# Patient Record
Sex: FEMALE | Race: WHITE | NOT HISPANIC OR LATINO | Employment: OTHER | URBAN - METROPOLITAN AREA
[De-identification: names, ages, dates, MRNs, and addresses within clinical notes are randomized per-mention and may not be internally consistent; named-entity substitution may affect disease eponyms.]

---

## 2024-04-18 PROBLEM — M19.90 OSTEOARTHRITIS: Status: ACTIVE | Noted: 2024-04-18

## 2024-04-18 PROBLEM — M19.90 DEGENERATIVE JOINT DISEASE: Status: ACTIVE | Noted: 2024-04-18

## 2024-04-18 PROBLEM — M85.80 OSTEOPENIA: Status: ACTIVE | Noted: 2024-04-18

## 2024-04-18 PROBLEM — F10.10 ALCOHOL ABUSE: Status: ACTIVE | Noted: 2024-04-18

## 2024-04-18 PROBLEM — F09 MILD COGNITIVE DISORDER: Status: ACTIVE | Noted: 2024-04-18

## 2024-04-18 PROBLEM — N18.2 STAGE 2 CHRONIC KIDNEY DISEASE: Status: ACTIVE | Noted: 2024-04-18

## 2024-04-18 PROBLEM — R41.3 POOR SHORT-TERM MEMORY: Status: ACTIVE | Noted: 2024-04-18

## 2024-04-18 PROBLEM — E03.8 SUBCLINICAL HYPOTHYROIDISM: Status: ACTIVE | Noted: 2024-04-18

## 2024-04-18 PROBLEM — E78.5 HYPERLIPIDEMIA: Status: ACTIVE | Noted: 2024-04-18

## 2024-04-18 PROBLEM — E78.00 PURE HYPERCHOLESTEROLEMIA: Status: ACTIVE | Noted: 2024-04-18

## 2024-04-18 PROBLEM — F43.29 ADJUSTMENT DISORDER WITH MIXED EMOTIONAL FEATURES: Status: ACTIVE | Noted: 2024-04-18

## 2024-04-18 PROBLEM — R92.8 ABNORMAL MAMMOGRAM: Status: ACTIVE | Noted: 2024-04-18

## 2024-04-18 PROBLEM — I12.9 MALIGNANT HYPERTENSIVE CHRONIC KIDNEY DISEASE: Status: ACTIVE | Noted: 2024-04-18

## 2024-04-18 PROBLEM — I10 ESSENTIAL HYPERTENSION: Status: ACTIVE | Noted: 2024-04-18

## 2024-04-19 ENCOUNTER — OFFICE VISIT (OUTPATIENT)
Dept: PRIMARY CARE | Facility: CLINIC | Age: 84
End: 2024-04-19
Payer: MEDICARE

## 2024-04-19 VITALS
OXYGEN SATURATION: 99 % | BODY MASS INDEX: 22.71 KG/M2 | SYSTOLIC BLOOD PRESSURE: 120 MMHG | DIASTOLIC BLOOD PRESSURE: 80 MMHG | HEIGHT: 64 IN | TEMPERATURE: 97 F | WEIGHT: 133 LBS | HEART RATE: 50 BPM

## 2024-04-19 DIAGNOSIS — I10 ESSENTIAL HYPERTENSION: Primary | ICD-10-CM

## 2024-04-19 PROCEDURE — 99213 OFFICE O/P EST LOW 20 MIN: CPT | Performed by: LICENSED PRACTICAL NURSE

## 2024-04-19 PROCEDURE — 1036F TOBACCO NON-USER: CPT | Performed by: LICENSED PRACTICAL NURSE

## 2024-04-19 PROCEDURE — 1159F MED LIST DOCD IN RCRD: CPT | Performed by: LICENSED PRACTICAL NURSE

## 2024-04-19 PROCEDURE — 3079F DIAST BP 80-89 MM HG: CPT | Performed by: LICENSED PRACTICAL NURSE

## 2024-04-19 PROCEDURE — 3074F SYST BP LT 130 MM HG: CPT | Performed by: LICENSED PRACTICAL NURSE

## 2024-04-19 PROCEDURE — 1160F RVW MEDS BY RX/DR IN RCRD: CPT | Performed by: LICENSED PRACTICAL NURSE

## 2024-04-19 PROCEDURE — 1126F AMNT PAIN NOTED NONE PRSNT: CPT | Performed by: LICENSED PRACTICAL NURSE

## 2024-04-19 RX ORDER — PRAVASTATIN SODIUM 40 MG/1
TABLET ORAL
COMMUNITY
End: 2024-04-19 | Stop reason: ALTCHOICE

## 2024-04-19 RX ORDER — ASCORBIC ACID 1000 MG
TABLET ORAL EVERY 24 HOURS
COMMUNITY
End: 2024-04-19 | Stop reason: WASHOUT

## 2024-04-19 RX ORDER — LISINOPRIL 20 MG/1
20 TABLET ORAL EVERY 24 HOURS
Qty: 30 TABLET | Refills: 11 | Status: SHIPPED | OUTPATIENT
Start: 2024-04-19

## 2024-04-19 RX ORDER — LISINOPRIL 20 MG/1
TABLET ORAL EVERY 24 HOURS
COMMUNITY
End: 2024-04-19 | Stop reason: SDUPTHER

## 2024-04-19 ASSESSMENT — PAIN SCALES - GENERAL: PAINLEVEL: 0-NO PAIN

## 2024-04-19 ASSESSMENT — PATIENT HEALTH QUESTIONNAIRE - PHQ9
2. FEELING DOWN, DEPRESSED OR HOPELESS: NOT AT ALL
SUM OF ALL RESPONSES TO PHQ9 QUESTIONS 1 AND 2: 0
1. LITTLE INTEREST OR PLEASURE IN DOING THINGS: NOT AT ALL

## 2024-04-19 NOTE — PROGRESS NOTES
[facility]: [facility]  PROGRESS NOTE      Keri Perez is a 83 y.o. female that is presenting today for Med Refill ([facility] from [facility]/).      Subjective   Pt presents to the office today to establish a new provider. She was seen by JAIME Hernandez NP who is no longer with the practice. She is requesting a refill of her medications. She denies HA, blurred vision, or dizziness. She reports having increased forgetfulness and concern regarding driving to new locations    Med Refill      Review of Systems   All other systems reviewed and are negative.     Objective   Vitals:    04/19/24 1310   BP: 120/80   Pulse: 50   Temp: 36.1 °C (97 °F)   SpO2: 99%      Body mass index is 22.83 kg/m².  Physical Exam  Cardiovascular:      Rate and Rhythm: Normal rate and regular rhythm.   Pulmonary:      Effort: Pulmonary effort is normal. No respiratory distress.      Breath sounds: Normal breath sounds. No stridor. No wheezing, rhonchi or rales.   Chest:      Chest wall: No tenderness.   Neurological:      Mental Status: She is confused.      Comments: Noted forgetfulness during conversation. Needed to repeat lisinopril purpose a few times.        Diagnostic Results   Lab Results   Component Value Date    GLUCOSE 98 05/23/2022    CALCIUM 9.8 05/23/2022     05/23/2022    K 4.1 05/23/2022    CO2 27 05/23/2022     05/23/2022    BUN 14 05/23/2022    CREATININE 0.9 05/23/2022     Lab Results   Component Value Date    ALT 21 05/23/2022    AST 25 05/23/2022    ALKPHOS 74 05/23/2022    BILITOT 1.0 05/23/2022     Lab Results   Component Value Date    WBC 5.7 05/23/2022    HGB 13.6 05/23/2022    HCT 40.7 05/23/2022    MCV 95.1 05/23/2022     05/23/2022     Lab Results   Component Value Date    CHOL 228 (H) 05/23/2022    CHOL 214 (H) 05/21/2021    CHOL 226 (H) 12/11/2020     Lab Results   Component Value Date    HDL 81 05/23/2022    HDL 74 05/21/2021    HDL 99 12/11/2020     Lab Results   Component  "Value Date    LDLCALC 126 2022    LDLCALC 118 2021    LDLCALC 109 2020     Lab Results   Component Value Date    TRIG 103 2022    TRIG 108 2021    TRIG 89 2020     No components found for: \"CHOLHDL\"  No results found for: \"HGBA1C\"  Other labs not included in the list above were reviewed either before or during this encounter.    History    Past Medical History:   Diagnosis Date    Dementia (Multi)      History reviewed. No pertinent surgical history.  No family history on file.  Social History     Socioeconomic History    Marital status:      Spouse name: Not on file    Number of children: Not on file    Years of education: Not on file    Highest education level: Not on file   Occupational History    Not on file   Tobacco Use    Smoking status: Former     Current packs/day: 0.00     Types: Cigarettes     Quit date:      Years since quittin.3     Passive exposure: Past    Smokeless tobacco: Former   Vaping Use    Vaping status: Never Used   Substance and Sexual Activity    Alcohol use: Yes     Alcohol/week: 3.0 standard drinks of alcohol     Types: 3 Glasses of wine per week    Drug use: Never    Sexual activity: Not on file   Other Topics Concern    Not on file   Social History Narrative    Not on file     Social Determinants of Health     Financial Resource Strain: Not on file   Food Insecurity: Not on file   Transportation Needs: Not on file   Physical Activity: Not on file   Stress: Not on file   Social Connections: Not on file   Intimate Partner Violence: Not on file   Housing Stability: Not on file     No Known Allergies  Current Outpatient Medications on File Prior to Visit   Medication Sig Dispense Refill    [DISCONTINUED] lisinopril 20 mg tablet Take by mouth once every 24 hours.      [DISCONTINUED] mv-min-folic-calcium carb-K1 (Women's 50 Plus Multivitamin) 400 mcg-500 mg calcium-20 mcg tablet once every 24 hours.      [DISCONTINUED] pravastatin " (Pravachol) 40 mg tablet Take by mouth.       No current facility-administered medications on file prior to visit.     Immunization History   Administered Date(s) Administered    Moderna SARS-CoV-2 Vaccination 01/27/2021, 03/03/2021, 11/11/2021    Pneumococcal conjugate vaccine, 13-valent (PREVNAR 13) 11/04/2014    Pneumococcal polysaccharide vaccine, 23-valent, age 2 years and older (PNEUMOVAX 23) 09/07/2010    Td vaccine, age 7 years and older (TDVAX) 01/02/2006     Patient's medical history was reviewed and updated either before or during this encounter.       Assessment/Plan   Problem List Items Addressed This Visit       Essential hypertension - Primary    Relevant Medications    lisinopril 20 mg tablet    Other Relevant Orders    Referral to Geriatrics - Senior Assessment   I will reorder her lisinopril. I will also refer Ms. Bradford for a Senior Assessment related to her increased forgetfulness and dementia as she report worsening dementia.     I will have her back in the office for her annual exam within 1 month.     Henri Hunter, APRN-CNP

## 2024-05-09 ENCOUNTER — OFFICE VISIT (OUTPATIENT)
Dept: BEHAVIORAL HEALTH | Facility: HOSPITAL | Age: 84
End: 2024-05-09
Payer: MEDICARE

## 2024-05-09 ENCOUNTER — OFFICE VISIT (OUTPATIENT)
Dept: GERIATRIC MEDICINE | Facility: HOSPITAL | Age: 84
End: 2024-05-09
Payer: MEDICARE

## 2024-05-09 ENCOUNTER — LAB (OUTPATIENT)
Dept: LAB | Facility: LAB | Age: 84
End: 2024-05-09
Payer: MEDICARE

## 2024-05-09 DIAGNOSIS — M85.89 OSTEOPENIA OF MULTIPLE SITES: ICD-10-CM

## 2024-05-09 DIAGNOSIS — I10 ESSENTIAL HYPERTENSION: ICD-10-CM

## 2024-05-09 DIAGNOSIS — R41.3 MEMORY LOSS: Primary | ICD-10-CM

## 2024-05-09 DIAGNOSIS — R41.3 MEMORY LOSS: ICD-10-CM

## 2024-05-09 LAB
ALBUMIN SERPL BCP-MCNC: 4.7 G/DL (ref 3.4–5)
ALP SERPL-CCNC: 69 U/L (ref 33–136)
ALT SERPL W P-5'-P-CCNC: 24 U/L (ref 7–45)
ANION GAP SERPL CALC-SCNC: 16 MMOL/L (ref 10–20)
AST SERPL W P-5'-P-CCNC: 29 U/L (ref 9–39)
BILIRUB SERPL-MCNC: 1.4 MG/DL (ref 0–1.2)
BUN SERPL-MCNC: 15 MG/DL (ref 6–23)
CALCIUM SERPL-MCNC: 9.4 MG/DL (ref 8.6–10.3)
CHLORIDE SERPL-SCNC: 106 MMOL/L (ref 98–107)
CO2 SERPL-SCNC: 23 MMOL/L (ref 21–32)
CREAT SERPL-MCNC: 0.85 MG/DL (ref 0.5–1.05)
EGFRCR SERPLBLD CKD-EPI 2021: 68 ML/MIN/1.73M*2
ERYTHROCYTE [DISTWIDTH] IN BLOOD BY AUTOMATED COUNT: 13.6 % (ref 11.5–14.5)
GLUCOSE SERPL-MCNC: 87 MG/DL (ref 74–99)
HCT VFR BLD AUTO: 41 % (ref 36–46)
HGB BLD-MCNC: 13.7 G/DL (ref 12–16)
MCH RBC QN AUTO: 32.2 PG (ref 26–34)
MCHC RBC AUTO-ENTMCNC: 33.4 G/DL (ref 32–36)
MCV RBC AUTO: 97 FL (ref 80–100)
NRBC BLD-RTO: 0 /100 WBCS (ref 0–0)
PLATELET # BLD AUTO: 221 X10*3/UL (ref 150–450)
POTASSIUM SERPL-SCNC: 3.9 MMOL/L (ref 3.5–5.3)
PROT SERPL-MCNC: 7.2 G/DL (ref 6.4–8.2)
RBC # BLD AUTO: 4.25 X10*6/UL (ref 4–5.2)
SODIUM SERPL-SCNC: 141 MMOL/L (ref 136–145)
TSH SERPL-ACNC: 3.31 MIU/L (ref 0.44–3.98)
WBC # BLD AUTO: 4.5 X10*3/UL (ref 4.4–11.3)

## 2024-05-09 PROCEDURE — 82607 VITAMIN B-12: CPT

## 2024-05-09 PROCEDURE — 1160F RVW MEDS BY RX/DR IN RCRD: CPT | Performed by: NURSE PRACTITIONER

## 2024-05-09 PROCEDURE — 36415 COLL VENOUS BLD VENIPUNCTURE: CPT

## 2024-05-09 PROCEDURE — 83090 ASSAY OF HOMOCYSTEINE: CPT

## 2024-05-09 PROCEDURE — 99483 ASSMT & CARE PLN PT COG IMP: CPT | Performed by: NURSE PRACTITIONER

## 2024-05-09 PROCEDURE — 80053 COMPREHEN METABOLIC PANEL: CPT

## 2024-05-09 PROCEDURE — 1159F MED LIST DOCD IN RCRD: CPT | Performed by: NURSE PRACTITIONER

## 2024-05-09 PROCEDURE — 82306 VITAMIN D 25 HYDROXY: CPT

## 2024-05-09 PROCEDURE — 85027 COMPLETE CBC AUTOMATED: CPT

## 2024-05-09 PROCEDURE — 84443 ASSAY THYROID STIM HORMONE: CPT

## 2024-05-09 PROCEDURE — 82746 ASSAY OF FOLIC ACID SERUM: CPT

## 2024-05-09 NOTE — PROGRESS NOTES
*Chief Complaint*  Senior assessment    *History of Present Illness*  Pt is an 82 y/o female w/ PMH as below who presents today for senior assessment evaluation    She presents with concern of memory loss. She mentioned their her memory loss is worsening. She is avoiding new things and have her usual routine. She have 2 children who lives in [place] and [place]. Lived in the same house for 53 years with  (passed ~10 years ago due to natural age related causes) who build the house(industrial //build toys, houses). Memory problem started about one year ago.  Her memory is not concerning when she engaged in conversation with others. She never had an event when she left things on (including stove or other home equipments)    Last MRI in  showed   1. No acute intracranial abnormality.  2. Chronic changes of bilateral cerebral microvascular white matter ischemia  and aging.    PCP: Edyta Hernandez (retired) --> recent new established care with Henri Hunter at  [facility] in 2024  Referred by: PCP   Accompanied by: Alone     PMH: HTN, colon polyp, breast lesion, memory loss  PSH: N/A  FH: N/A  SocHx: lives alone at her home (house), denies smoking, drinks 1-2 glass wine occasionally, and no drug use   Diet: well balanced diet   Allergies: NKA  Meds: None (stopped lisinopril in the last 2 months by PCP)    VS - BP: 166/81  P:   69 SpO2: 97%    Current residence: single family home     Marital Status:    - Name of spouse Aakash Perez    - Name(s) of offspring daughter Vira and son Winston    - Name(s) of pets none (used to have dogs and fish)    Educational level: College    Education: [employer], Major in Zimbabwean / English     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 Meet with friends for breakfast (teachers) once a month /  breakfast places.     Social engagement: Frequent<Occasional    Social activities: spending time with family    Social support: yes, family, friends, neighbors, and significant other (dementia dx)     Name of caregiver: Support-> daughter     - Relationship: daughter Vira    - Occupation: Works for [employer] ([employer])   - Telephone: [phone]    - Stress level unknown (looking for nursing home or have a roommate)      Finance: sufficient    Driving:  yes , does not go to new places     Personal safety concerns:  no     Home safety concerns: no / no recent falls     Functional status:   - Appetite: WNL   - Swallow: WNL,   - Elimination - Bowel: Continent,   - Elimination - Bladder: Continent   - Speech:  WNL   - Vision: WNL   - Hearing: WNL   - Understanding: WNL   - Upper body strength: WNL   - Lower body strength: WNL   - Balance: WNL   - Falls: None    Activity: Independent   - Ambulation inside / Independent   - Ambulation outside /Independent   - Aid use None   - Exercise/ walking daily and taking care of her yard    ADL: Independent   - Feeding Independent   - Bathing Independent   - Dressing Independent   - Toileting Independent    IADL: Independent   - Cooking Independent   - Cleaning Independent   - Shopping  Independent    - Medication Independent   - Driving Independent   - Banking / daughter     *Review of Systems*  All other systems reviewed are negative except as noted in the HPI    *Physical Exam*  Gen: (+) NAD, (+) well-appearing  HEENT: (+) normocephalic  Neck: (+) supple  Lungs: (+) CTAB, (-) wheezes, (-) rales, (-) rhonchi  Heart: (+) RRR, (+) S1 S2, (-) murmurs  Pulses: (+) palpable  Abd: (+) soft, (+) NT, (+) ND, (+) BS+  Ext: (-) edema, (-) deformity  MSK: (-) joint swelling  Skin: (+) warm, (+) dry, (-) rash  Neuro: (+) follows commands, (-) tremor, (+) alert    *Results*  MoCA:  24/30  PHQ9:  1/27  GAD7:  1/21    *Assessment / Plan*  Senior Assessment/Recommendations     Medical  Consult   - Test results - as above   - Cognition and Memory - as above   - Additional testing pending / recommended      - home evaluation     - driving evaluation    #Mild Cognitive Impairment   * Diet     - The risk of developing MCI is lower in individuals who consume a Mediterranean diet      - Dietary supplementation with DHA, melatonin and tryptophan may improve cognitive function      - Eating 2 or more servings of mushrooms a week may decrease risk of memory loss    * Exercise      - Physical activity and exercise are beneficial for brain health.       - According to one study, aerobic exercise was associated with a slight improvement in cognition    * Activities     - Mentally challenging activities like crossword puzzles and brain teasers may be helpful     - Playing games, reading magazines, being engaged in crafts, computer use and social activity all reduce the risk of memory loss      - [facility] centers or adult day programs may be a good resource for social engagement   * Medications      - Donepezil has been found to delay the progression of memory loss in patients with MCI and depression      - Centrally acting angiotensin-converting enzyme inhibitors (CACE-Is) may reduce the rate of cognitive decline in patients with dementia, regardless of blood pressure levels at the time of their hypertension diagnosis. The rate of cognitive change was slowed in the first 6 months after dementia patients started taking these medications.     #Hypertension  - your blood pressure is poorly controlled today  *Recommendations  - according to JNC8 guideline recommendations for lifestyle changes    - no smoking    - DASH diet or Mediterranean diet    - limit alcohol consumption    - reduce sodium intake to less than 2400mg per day    - gradually increase the amount of physical activity and exercise that you get  - continue taking your medications as prescribed    - consider increasing lisinopril to 40mg daily  -  monitor your blood pressure at home twice per day - bring your monitor and/or your records of these readings with you to all appointments     - if your blood pressure readings are consistently greater than 130/80 or less than 100/60 - please call your PCP - you may need a medication adjustment     - if you develop chest pain, shortness of breath, headache, dizziness, vision changes, or weakness or your blood pressure readings are ever greater than 180/110 - please recheck the readings at least twice - and if still elevated please go to the emergency room      #Homocysteinemia   - recommend starting homocysteine formulated B complex

## 2024-05-10 LAB
25(OH)D3 SERPL-MCNC: 71 NG/ML (ref 30–100)
FOLATE SERPL-MCNC: >24 NG/ML
HCYS SERPL-SCNC: 16.51 UMOL/L (ref 5–13.9)
VIT B12 SERPL-MCNC: 475 PG/ML (ref 211–911)

## 2024-05-22 ENCOUNTER — HOSPITAL ENCOUNTER (OUTPATIENT)
Dept: RADIOLOGY | Facility: HOSPITAL | Age: 84
Discharge: HOME | End: 2024-05-22
Payer: MEDICARE

## 2024-05-22 DIAGNOSIS — M85.89 OSTEOPENIA OF MULTIPLE SITES: ICD-10-CM

## 2024-05-22 DIAGNOSIS — R41.3 MEMORY LOSS: ICD-10-CM

## 2024-05-22 DIAGNOSIS — I10 ESSENTIAL HYPERTENSION: ICD-10-CM

## 2024-05-22 PROCEDURE — 70551 MRI BRAIN STEM W/O DYE: CPT | Performed by: RADIOLOGY

## 2024-05-22 PROCEDURE — 70551 MRI BRAIN STEM W/O DYE: CPT

## 2024-05-28 ENCOUNTER — LAB (OUTPATIENT)
Dept: LAB | Facility: LAB | Age: 84
End: 2024-05-28
Payer: MEDICARE

## 2024-05-28 ENCOUNTER — OFFICE VISIT (OUTPATIENT)
Dept: PRIMARY CARE | Facility: CLINIC | Age: 84
End: 2024-05-28
Payer: MEDICARE

## 2024-05-28 VITALS
OXYGEN SATURATION: 97 % | DIASTOLIC BLOOD PRESSURE: 70 MMHG | TEMPERATURE: 97 F | SYSTOLIC BLOOD PRESSURE: 120 MMHG | WEIGHT: 131 LBS | HEART RATE: 83 BPM | BODY MASS INDEX: 22.36 KG/M2 | HEIGHT: 64 IN

## 2024-05-28 DIAGNOSIS — R73.9 ELEVATED BLOOD SUGAR: ICD-10-CM

## 2024-05-28 DIAGNOSIS — Z01.89 ENCOUNTER FOR ROUTINE LABORATORY TESTING: ICD-10-CM

## 2024-05-28 DIAGNOSIS — E55.9 VITAMIN D DEFICIENCY: ICD-10-CM

## 2024-05-28 DIAGNOSIS — R79.9 ABNORMAL FINDING OF BLOOD CHEMISTRY, UNSPECIFIED: ICD-10-CM

## 2024-05-28 DIAGNOSIS — F09 MILD COGNITIVE DISORDER: ICD-10-CM

## 2024-05-28 DIAGNOSIS — M81.6 LOCALIZED OSTEOPOROSIS (LEQUESNE): ICD-10-CM

## 2024-05-28 DIAGNOSIS — R94.6 ABNORMAL RESULTS OF THYROID FUNCTION STUDIES: ICD-10-CM

## 2024-05-28 DIAGNOSIS — I67.9 SMALL VESSEL DISEASE, CEREBROVASCULAR: ICD-10-CM

## 2024-05-28 DIAGNOSIS — Z13.820 ENCOUNTER FOR SCREENING FOR OSTEOPOROSIS: ICD-10-CM

## 2024-05-28 DIAGNOSIS — D22.9 ATYPICAL MOLE: ICD-10-CM

## 2024-05-28 DIAGNOSIS — Z00.00 MEDICARE ANNUAL WELLNESS VISIT, INITIAL: Primary | ICD-10-CM

## 2024-05-28 DIAGNOSIS — M85.80 OSTEOPENIA, UNSPECIFIED LOCATION: ICD-10-CM

## 2024-05-28 DIAGNOSIS — Z13.6 ENCOUNTER FOR SCREENING FOR CARDIOVASCULAR DISORDERS: ICD-10-CM

## 2024-05-28 DIAGNOSIS — R41.3 POOR SHORT-TERM MEMORY: ICD-10-CM

## 2024-05-28 DIAGNOSIS — I10 HYPERTENSION, UNSPECIFIED TYPE: ICD-10-CM

## 2024-05-28 LAB
ALBUMIN SERPL-MCNC: 4.5 G/DL (ref 3.5–5)
ALP BLD-CCNC: 83 U/L (ref 35–125)
ALT SERPL-CCNC: 18 U/L (ref 5–40)
ANION GAP SERPL CALC-SCNC: 10 MMOL/L
AST SERPL-CCNC: 22 U/L (ref 5–40)
BILIRUB DIRECT SERPL-MCNC: <0.2 MG/DL (ref 0–0.2)
BILIRUB SERPL-MCNC: 0.8 MG/DL (ref 0.1–1.2)
BUN SERPL-MCNC: 25 MG/DL (ref 8–25)
CALCIUM SERPL-MCNC: 9.6 MG/DL (ref 8.5–10.4)
CHLORIDE SERPL-SCNC: 103 MMOL/L (ref 97–107)
CO2 SERPL-SCNC: 28 MMOL/L (ref 24–31)
CREAT SERPL-MCNC: 0.9 MG/DL (ref 0.4–1.6)
EGFRCR SERPLBLD CKD-EPI 2021: 64 ML/MIN/1.73M*2
EST. AVERAGE GLUCOSE BLD GHB EST-MCNC: 105 MG/DL
GLUCOSE SERPL-MCNC: 90 MG/DL (ref 65–99)
HBA1C MFR BLD: 5.3 %
POTASSIUM SERPL-SCNC: 4.3 MMOL/L (ref 3.4–5.1)
PROT SERPL-MCNC: 7.2 G/DL (ref 5.9–7.9)
SODIUM SERPL-SCNC: 141 MMOL/L (ref 133–145)

## 2024-05-28 PROCEDURE — 82248 BILIRUBIN DIRECT: CPT

## 2024-05-28 PROCEDURE — 3074F SYST BP LT 130 MM HG: CPT | Performed by: LICENSED PRACTICAL NURSE

## 2024-05-28 PROCEDURE — 1126F AMNT PAIN NOTED NONE PRSNT: CPT | Performed by: LICENSED PRACTICAL NURSE

## 2024-05-28 PROCEDURE — 36415 COLL VENOUS BLD VENIPUNCTURE: CPT

## 2024-05-28 PROCEDURE — 1159F MED LIST DOCD IN RCRD: CPT | Performed by: LICENSED PRACTICAL NURSE

## 2024-05-28 PROCEDURE — 3078F DIAST BP <80 MM HG: CPT | Performed by: LICENSED PRACTICAL NURSE

## 2024-05-28 PROCEDURE — 83036 HEMOGLOBIN GLYCOSYLATED A1C: CPT

## 2024-05-28 PROCEDURE — 1160F RVW MEDS BY RX/DR IN RCRD: CPT | Performed by: LICENSED PRACTICAL NURSE

## 2024-05-28 PROCEDURE — 80053 COMPREHEN METABOLIC PANEL: CPT

## 2024-05-28 PROCEDURE — 80061 LIPID PANEL: CPT

## 2024-05-28 PROCEDURE — 99214 OFFICE O/P EST MOD 30 MIN: CPT | Performed by: LICENSED PRACTICAL NURSE

## 2024-05-28 PROCEDURE — 84443 ASSAY THYROID STIM HORMONE: CPT

## 2024-05-28 RX ORDER — CHOLECALCIFEROL (VITAMIN D3) 25 MCG
1000 TABLET ORAL DAILY
COMMUNITY

## 2024-05-28 ASSESSMENT — PAIN SCALES - GENERAL: PAINLEVEL: 0-NO PAIN

## 2024-05-28 NOTE — PROGRESS NOTES
[facility]: [facility]  PHYSICAL EXAM      Keri Perez is a 83 y.o. female that is presenting today for Annual Exam.    Assessment/Plan   Problem List Items Addressed This Visit       Poor short-term memory    Osteopenia     -vitamin d 3 1000 international units daily   -serum vitamin d  -dexa scan         Mild cognitive disorder     -mri showing chronic small vessel ischemic changes, mild and mild diffuse cerebral volume loss.   -referral to neurology         Hypertension     -lisinopril 20mg PO daily  -bmp         Atypical mole     -referral to dermatology         Relevant Orders    Referral to Dermatology    Elevated blood sugar    Relevant Orders    Hemoglobin A1C (Completed)    Medicare annual wellness visit, initial - Primary    Small vessel disease, cerebrovascular    Relevant Orders    Referral to Neurology     Other Visit Diagnoses       Vitamin D deficiency        Encounter for screening for osteoporosis        Relevant Orders    XR DEXA bone density    Localized osteoporosis ([place])        Relevant Orders    XR DEXA bone density    Abnormal results of thyroid function studies        Relevant Orders    TSH with reflex to Free T4 if abnormal           Subjective   Pt presents to the office today for her annual physical exam. Ms. Perez has a PMH of subclinical hypothyroidism, CKD 2, hypercholesterolemia, poor short term memory, osteopenia, OA, and HTN.  She was seen at our office last on 4/19/24 to establish a new provider and medication refill. At that time Ms. Perez was noted to have increased forgetfulness and stable BP. I refilled her lisinopril and  referred her for a senior assessment where an MRI of the brain was ordered. Her MRI showed probable chronic small vessel ischemic changes, mildly and age related mild diffuse cerebral volume loss. I also recommended that she have her daughter come to her office appointments due to her increased forgetfulness.     Today Ms. Perez  shares that she tends to forget where she is going and not recognize her surroundings at times, but she generally does well driving as long as she stays in areas that she is familiar with. Ms. Perez expresses that she only drives to familiar places. Aside from this Ms Perez denies other concerns. She denies HA, dizziness, blurred vision, SOB, CP, palpitations, ABD pain, changes in her stool, blood in her stool, urinary frequency, blood in her urine, swelling of her legs, increased weakness or new moles.            Review of Systems   All other systems reviewed and are negative.     Objective   Vitals:    05/28/24 1305   BP: 120/70   Pulse: 83   Temp: 36.1 °C (97 °F)   SpO2: 97%     Body mass index is 22.49 kg/m².  Physical Exam  Vitals reviewed.   Constitutional:       General: She is not in acute distress.     Appearance: She is not ill-appearing or toxic-appearing.   Neck:      Vascular: No carotid bruit or JVD.   Cardiovascular:      Rate and Rhythm: Normal rate and regular rhythm.      Heart sounds: Normal heart sounds, S1 normal and S2 normal. No murmur heard.  Pulmonary:      Effort: No respiratory distress.      Breath sounds: No wheezing or rales.   Abdominal:      General: There is no abdominal bruit.      Palpations: Abdomen is soft. There is no mass.      Tenderness: There is no abdominal tenderness. There is no guarding or rebound.      Hernia: No hernia is present.   Musculoskeletal:      Right lower leg: No edema.      Left lower leg: No edema.   Lymphadenopathy:      Cervical:      Right cervical: No superficial, deep or posterior cervical adenopathy.     Left cervical: No superficial, deep or posterior cervical adenopathy.   Skin:            Comments: Abnormal texture and color mole to lower back   Neurological:      Mental Status: She is alert.      Comments: Moderately forgetful with need for repeating of conversation        Diagnostic Results   Lab Results   Component Value Date    GLUCOSE 90  "2024    CALCIUM 9.6 2024     2024    K 4.3 2024    CO2 28 2024     2024    BUN 25 2024    CREATININE 0.90 2024     Lab Results   Component Value Date    ALT 18 2024    AST 22 2024    ALKPHOS 83 2024    BILITOT 0.8 2024     Lab Results   Component Value Date    WBC 4.5 2024    HGB 13.7 2024    HCT 41.0 2024    MCV 97 2024     2024     Lab Results   Component Value Date    CHOL 248 (H) 2024    CHOL 228 (H) 2022    CHOL 214 (H) 2021     Lab Results   Component Value Date    HDL 78.0 2024    HDL 81 2022    HDL 74 2021     Lab Results   Component Value Date    LDLCALC 135 (H) 2024    LDLCALC 126 2022    LDLCALC 118 2021     Lab Results   Component Value Date    TRIG 173 (H) 2024    TRIG 103 2022    TRIG 108 2021     No components found for: \"CHOLHDL\"  Lab Results   Component Value Date    HGBA1C 5.3 2024     Other labs not included in the list above were reviewed either before or during this encounter.    History   Past Medical History:   Diagnosis Date    Dementia (Multi)      History reviewed. No pertinent surgical history.  No family history on file.  Social History     Socioeconomic History    Marital status:      Spouse name: Not on file    Number of children: Not on file    Years of education: Not on file    Highest education level: Not on file   Occupational History    Not on file   Tobacco Use    Smoking status: Former     Current packs/day: 0.00     Types: Cigarettes     Quit date:      Years since quittin.4     Passive exposure: Past    Smokeless tobacco: Former   Vaping Use    Vaping status: Never Used   Substance and Sexual Activity    Alcohol use: Yes     Alcohol/week: 3.0 standard drinks of alcohol     Types: 3 Glasses of wine per week    Drug use: Never    Sexual activity: Not on file   Other " Topics Concern    Not on file   Social History Narrative    Not on file     Social Determinants of Health     Financial Resource Strain: Not on file   Food Insecurity: Not on file   Transportation Needs: Not on file   Physical Activity: Not on file   Stress: Not on file   Social Connections: Not on file   Intimate Partner Violence: Not on file   Housing Stability: Not on file     No Known Allergies  Current Outpatient Medications on File Prior to Visit   Medication Sig Dispense Refill    cholecalciferol (Vitamin D3) 25 MCG (1000 UT) tablet Take 1 tablet (1,000 Units) by mouth once daily.      lisinopril 20 mg tablet Take 1 tablet (20 mg) by mouth once every 24 hours. (Patient not taking: Reported on 5/28/2024) 30 tablet 11    mv-mn/folic ac/calcium/vit K1 (WOMEN'S 50 PLUS MULTIVITAMIN ORAL) Take by mouth once every 24 hours.       No current facility-administered medications on file prior to visit.     Immunization History   Administered Date(s) Administered    Moderna SARS-CoV-2 Vaccination 01/27/2021, 03/03/2021, 11/11/2021    Pneumococcal conjugate vaccine, 13-valent (PREVNAR 13) 11/04/2014    Pneumococcal polysaccharide vaccine, 23-valent, age 2 years and older (PNEUMOVAX 23) 09/07/2010    Td vaccine, age 7 years and older (TDVAX) 01/02/2006     Patient's medical history was reviewed and updated either before or during this encounter.   Ms. Perez is generally healthy. Her VS are stable and she is without concerns.   With her permission I called Ms. Perez's daughter, Vira, to discuss her plan of care.   Regarding her Care Gaps she would like to discuss her vaccination options including RSV, Zoster, Tdap and Covid, with her daughter while she is home with daughter.     I will order her a Dexa scan due to her history of osteoporosis.     Her exam is benign aside from increased forgetfulness and a mole to her lower back. Due to her increased forgetfulness and MRI abnormalities I will refer her to Neurology for  further evaluation. I will also refer her to dermatology for evaluation of the mole.     Finally I will order annual blood work including CBC, CMP, hepatic and lipid panel, TSH, A1C and 1x calcium scoring test. She will follow up in 6 months for a regular check up.     Henri Hunter, JOHN-CNP

## 2024-05-29 DIAGNOSIS — R79.9 ABNORMAL FINDING OF BLOOD CHEMISTRY, UNSPECIFIED: ICD-10-CM

## 2024-05-29 DIAGNOSIS — Z01.89 ENCOUNTER FOR ROUTINE LABORATORY TESTING: Primary | ICD-10-CM

## 2024-05-29 LAB
CHOLEST SERPL-MCNC: 248 MG/DL (ref 133–200)
CHOLEST/HDLC SERPL: 3.2 {RATIO}
HDLC SERPL-MCNC: 78 MG/DL
LDLC SERPL CALC-MCNC: 135 MG/DL (ref 65–130)
TRIGL SERPL-MCNC: 173 MG/DL (ref 40–150)

## 2024-05-29 NOTE — RESULT ENCOUNTER NOTE
Please inform Ms. Perez her blood work looks good. The only thing concerning is her lipid levels ar starting to rise slightly. She should focus on eating non-saturated fats, or healthy oils and fresh fruits/veggies. She should also participate in healthy low intensity exercise regimen to remain in optimal health.

## 2024-05-30 PROBLEM — Z01.89 ENCOUNTER FOR ROUTINE LABORATORY TESTING: Status: ACTIVE | Noted: 2024-05-30

## 2024-05-30 PROBLEM — R68.89 FORGETFULNESS: Status: ACTIVE | Noted: 2024-05-30

## 2024-05-30 PROBLEM — R73.9 ELEVATED BLOOD SUGAR: Status: ACTIVE | Noted: 2024-05-30

## 2024-05-30 PROBLEM — D22.9 ATYPICAL MOLE: Status: ACTIVE | Noted: 2024-05-30

## 2024-05-30 PROBLEM — Z00.00 MEDICARE ANNUAL WELLNESS VISIT, INITIAL: Status: ACTIVE | Noted: 2024-05-30

## 2024-05-30 PROBLEM — I67.9 SMALL VESSEL DISEASE, CEREBROVASCULAR: Status: ACTIVE | Noted: 2024-05-30

## 2024-05-30 PROBLEM — L72.9 CYST OF SKIN: Status: ACTIVE | Noted: 2024-05-30

## 2024-05-30 PROBLEM — Z13.6 ENCOUNTER FOR SCREENING FOR CARDIOVASCULAR DISORDERS: Status: ACTIVE | Noted: 2024-05-30

## 2024-05-30 LAB — TSH SERPL DL<=0.05 MIU/L-ACNC: 2.58 MIU/L (ref 0.27–4.2)

## 2024-05-30 NOTE — ASSESSMENT & PLAN NOTE
-mri showing chronic small vessel ischemic changes, mild and mild diffuse cerebral volume loss.   -referral to neurology

## 2024-06-04 ENCOUNTER — TELEPHONE (OUTPATIENT)
Dept: PRIMARY CARE | Facility: CLINIC | Age: 84
End: 2024-06-04

## 2024-06-06 ENCOUNTER — OFFICE VISIT (OUTPATIENT)
Dept: GERIATRIC MEDICINE | Facility: HOSPITAL | Age: 84
End: 2024-06-06
Payer: MEDICARE

## 2024-06-06 ENCOUNTER — APPOINTMENT (OUTPATIENT)
Dept: GERIATRIC MEDICINE | Facility: HOSPITAL | Age: 84
End: 2024-06-06
Payer: MEDICARE

## 2024-06-06 ENCOUNTER — DOCUMENTATION (OUTPATIENT)
Dept: GERIATRIC MEDICINE | Facility: HOSPITAL | Age: 84
End: 2024-06-06
Payer: MEDICARE

## 2024-06-06 ENCOUNTER — APPOINTMENT (OUTPATIENT)
Dept: BEHAVIORAL HEALTH | Facility: HOSPITAL | Age: 84
End: 2024-06-06
Payer: MEDICARE

## 2024-06-06 ENCOUNTER — DOCUMENTATION (OUTPATIENT)
Dept: GERIATRIC MEDICINE | Facility: HOSPITAL | Age: 84
End: 2024-06-06

## 2024-06-06 DIAGNOSIS — R41.3 MEMORY LOSS: Primary | ICD-10-CM

## 2024-06-06 DIAGNOSIS — M85.89 OSTEOPENIA OF MULTIPLE SITES: ICD-10-CM

## 2024-06-06 DIAGNOSIS — I10 ESSENTIAL HYPERTENSION: ICD-10-CM

## 2024-06-06 PROCEDURE — 99366 TEAM CONF W/PAT BY HC PROF: CPT | Performed by: NURSE PRACTITIONER

## 2024-06-06 PROCEDURE — 99497 ADVNCD CARE PLAN 30 MIN: CPT | Performed by: NURSE PRACTITIONER

## 2024-06-06 NOTE — PROGRESS NOTES
Impressions:  Mild Cognitive Impairment    Consider Donepezil which has been found to delay the progression of memory loss in patients with mild cognitive impairments  Recommend driving evaluation due to memory changes  Reviewed the 5/22/24  MRI of the Brain and Blood Work  Recommend repeat MOCA in 6 months    MOCA- [place] Cognitive Assessment  24 our 30 Mild cognitive Impairments       Diet  The risk of developing MCI is lower in individuals who consume a Mediterranean diet   Dietary supplementation with DHA, melatonin and tryptophan may improve cognitive  function   Eating 2 or more servings of mushrooms a week may decrease risk of memory loss        Exercise  Physical activity and exercise are beneficial for brain health.   According to one study, aerobic exercise was associated with a slight improvement in cognition       R. Ana (cont.)     Activities  1. Mentally challenging activities like crossword puzzles and brain teasers may be    helpful  2. Playing games, reading magazines, being engaged in crafts, computer use and social activity all reduce the risk of memory loss      - 3.  Senior centers or adult day programs may be a good resource for social engagement     Medications  1. Donepezil has been found to delay the progression of memory loss in patients with MCI and       #Hypertension  - Your blood pressure is poorly controlled today      1.  According to JNC8 guideline recommendations for lifestyle changes       no smoking       DASH diet or Mediterranean diet       limit alcohol consumption       reduce sodium intake to less than 2400mg per day      gradually increase the amount of physical activity and exercise that you get  Continue taking your medications as prescribed  Consider increasing lisinopril to 40mg daily    5.   Monitor your blood pressure at home twice per day - bring your monitor and/or                your       records        of these readings with you to all appointments     6.    Your blood pressure readings are consistently greater than 130/80 or less than            100/60 -    please call your PCP - you may need a medication adjustment  if you develop chest pain, shortness of breath, headache, dizziness, vision changes, or weakness or your blood pressure readings are ever greater than 180/110 - please recheck the readings at least twice - and if still elevated please go to the emergency room      #Homocysteinemia   - Recommend starting homocysteine formulated B complex- you can get this at your local drug store or order on line.      Occupational Therapy Recommendations: Home Evaluation  Declined        consult  MAYELA TrejoEd., LSW    84 year old  ( 50 years  3-4 years) she has a son Winston that lives in [place] and a daughter Vira that lives in [place] she has 5 granddaughter. 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 Patient is very active, walks 1 + hours daily, mows her own lawn.  Patient is independent with ADL's and IADL's. Patient drinks 3 glasses of wine per week she is a former smoker quit in 1974.  Patient had a male  who was a classmate he had to be admitted to an assisted living facility due to advanced dementia. Patient is concern regarding STM loss and confusion.    Driving Evaluation -   [facility] at the    #[phone]  [facility] [phone] option 2  [facility] - [phone]    Recommend Medical Alert Device to be worn at all times for safety.    Mild Cognitive Impairments  May improve with individuals who consume a Mediterranean diet   Physical activity and exercise are beneficial for brain health  Mentally challenging activities like crossword puzzles and brain teasers may be helpful  Social engagement helps  improve mood      5/6/24   Family Meeting    In attendance: Patient, Dario Maravilla, ZAHRA and YOUSUF Yanez M.Ed., LSW  To Review and discuss the Overall Findings and Recommendations  Recommended return in 6 months to repeat MOCA  To fax the Senior Assessment Report and Recommendations to PCP patient to follow Up with PCP ongoing    To Review Advanced Directives including POA for Health Care, Finances, Living Will     Rachel Yanez M.Ed., LSW  Coordinator, [facility]-Senior Assessment Program  [facility]  [address]  [place], [place]    [id number] [phone] fax  [email or web address]

## 2024-06-12 NOTE — PROGRESS NOTES
Mild Cognitive Impairment    Consider Donepezil which has been found to delay the progression of memory loss in patients with mild cognitive impairments  Recommend driving evaluation due to memory changes  PHQ-9 and KENYA- Denied both depression and anxiety  Reviewed the 5/22/24  MRI of the Brain and Blood Work  Recommend repeat MOCA in 6 months    MOCA- [place] Cognitive Assessment  24 our 30 Mild cognitive Impairments      Diet  The risk of developing MCI is lower in individuals who consume a Mediterranean diet   Dietary supplementation with DHA, melatonin and tryptophan may improve cognitive  function   Eating 2 or more servings of mushrooms a week may decrease risk of memory loss       Exercise  Physical activity and exercise are beneficial for brain health.   According to one study, aerobic exercise was associated with a slight improvement in cognition                     R. Ana (cont.)     Activities  1. Mentally challenging activities like crossword puzzles and brain teasers may be       helpful  2. Playing games, reading magazines, being engaged in crafts, computer use and social activity all reduce the risk of memory loss      - 3.  Senior centers or adult day programs may be a good resource for social engagement     Medications  Donepezil has been found to delay the progression of memory loss in patients with MCI and       Hypertension  - Your blood pressure is poorly controlled today    According to JNC8 guideline recommendations for lifestyle changes  Recommend  no smoking  DASH diet or Mediterranean diet  limit alcohol consumption  reduce sodium intake to less than 2400mg per day  gradually increase the amount of physical activity and exercise that you get  Continue taking your medications as prescribed  Consider increasing lisinopril to 40mg daily  Monitor your blood pressure at home twice per day - bring your monitor and/or                            your  records of these readings with you to all  appointments  Your blood pressure readings are consistently greater than 130/80 or less than                  100/60 -    please call your PCP - you may need a medication adjustment  if you develop chest pain, shortness of breath, headache, dizziness, vision changes, or weakness or your blood pressure readings are ever greater than 180/110 - please recheck the readings at least twice - and if still elevated please go to the emergency room      Homocysteinemia   - Recommend starting homocysteine formulated B complex- you can get this at your local drug store or order on line.      Occupational Therapy Recommendations: Home Evaluation  Declined                BERNICE Perez (cont.)      consult  MAYELA TrejoEd., LSW  84 year old  ( 50 years  3-4 years) she has a son Winston that lives in [place] and a daughter Vira that lives in [place] she has 5 granddaughter. 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 Patient is very active, walks 1 + hours daily, mows her own lawn.  Patient is independent with ADL's and IADL's. Patient drinks 3 glasses of wine per week she is a former smoker quit in 1974.  Patient had a male  who was a classmate he had to be admitted to an assisted living facility due to advanced dementia. Patient is concern regarding STM loss and confusion.    Driving Evaluation -   [facility] at the    #[phone]  [facility] [phone] option 2  [facility] - [phone]    Recommend Medical Alert Device to be worn at all times for safety.    Mild Cognitive Impairments  May improve with individuals who consume a Mediterranean diet   Physical activity and exercise are beneficial for brain health  Mentally challenging activities like crossword puzzles and brain  teasers may be helpful  Social engagement helps improve mood      5/6/24   Family Meeting  In attendance: Patient, Dario Maravilla, ZAHRA and YOUSUF Yanez M.Ed., LSW.  Patient did not bring family members to meeting  To Review and discuss the Overall Findings and Recommendations  Recommended return in 6 months to repeat MOCA  Concerns noted regarding forgetting where she had parked and what kind of car she drives.  Contacted Patients daughter Vira discussed concerns regarding memory and anxiety.    To fax the Senior Assessment Report and Recommendations to PCP patient to follow Up with PCP ongoing  Recommend reviewing Advanced Directives including POA for Health Care, Finances, Living Will

## 2024-06-13 NOTE — PROGRESS NOTES
[facility] -Discharge Summary     Patients Name: Keri Perez       ALEJANDRAB: [birth year]  Date of Assessment:   5/9/24       MRN-# [id number]  Primary Care Physician: Roger River      Referred by: PCP  Reason for Assessment: PCP    Consults  Dario Maravilla CNP, Residents,     Impressions:  Mild Cognitive Impairment    Consider Donepezil which has been found to delay the progression of memory loss in patients with mild cognitive impairments  Recommend driving evaluation due to memory changes  Reviewed the 5/22/24  MRI of the Brain and Blood Work  Recommend repeat MOCA in 6 months    MOCA- [place] Cognitive Assessment  24 our 30 Mild cognitive Impairments       Diet  The risk of developing MCI is lower in individuals who consume a Mediterranean diet   Dietary supplementation with DHA, melatonin and tryptophan may improve cognitive  function   Eating 2 or more servings of mushrooms a week may decrease risk of memory loss        Exercise  Physical activity and exercise are beneficial for brain health.   According to one study, aerobic exercise was associated with a slight improvement in cognition                     BERNICE Perez (cont.)     Activities  1. Mentally challenging activities like crossword puzzles and brain teasers may be    helpful  2. Playing games, reading magazines, being engaged in crafts, computer use and social activity all reduce the risk of memory loss      - 3.  Senior centers or adult day programs may be a good resource for social engagement     Medications  1. Donepezil has been found to delay the progression of memory loss in patients with MCI and       #Hypertension  - Your blood pressure is poorly controlled today      1.  According to JNC8 guideline recommendations for lifestyle changes       no smoking       DASH diet or Mediterranean diet       limit alcohol consumption       reduce sodium intake to less than 2400mg per day      gradually  increase the amount of physical activity and exercise that you get  Continue taking your medications as prescribed  Consider increasing lisinopril to 40mg daily    5.   Monitor your blood pressure at home twice per day - bring your monitor and/or                your       records        of these readings with you to all appointments     6.   Your blood pressure readings are consistently greater than 130/80 or less than            100/60 -    please call your PCP - you may need a medication adjustment  if you develop chest pain, shortness of breath, headache, dizziness, vision changes, or weakness or your blood pressure readings are ever greater than 180/110 - please recheck the readings at least twice - and if still elevated please go to the emergency room      #Homocysteinemia   - Recommend starting homocysteine formulated B complex- you can get this at your local drug store or order on line.      Occupational Therapy Recommendations: Home Evaluation  Declined                        consult  Rachel Yanez M.Ed., W    84 year old  ( 50 years  3-4 years) she has a son Winston that lives in [place] and a daughter Vira that lives in [place] she has 5 granddaughter. 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 Patient is very active, walks 1 + hours daily, mows her own lawn.  Patient is independent with ADL's and IADL's. Patient drinks 3 glasses of wine per week she is a former smoker quit in 1974.  Patient had a male  who was a classmate he had to be admitted to an assisted living facility due to advanced dementia. Patient is concern regarding STM loss and confusion.    Driving Evaluation -   [facility] at the    #[phone]  [facility] [phone] option 2  [facility]  [facility] [phone]    Recommend Medical Alert Device to be worn at all times for safety.    Mild Cognitive Impairments  May improve with individuals who consume a Mediterranean diet   Physical activity and exercise are beneficial for brain health  Mentally challenging activities like crossword puzzles and brain teasers may be helpful  Social engagement helps improve mood      5/6/24   Family Meeting    In attendance: Patient, Dario Maravilla, ZAHRA and YOUSUF Yanez M.Ed., LSW  To Review and discuss the Overall Findings and Recommendations  Recommended return in 6 months to repeat MOCA  To fax the Senior Assessment Report and Recommendations to PCP patient to follow Up with PCP ongoing    To Review Advanced Directives including POA for Health Care, Finances, Living Will     Rachel Yanez M.Ed., LSW  Coordinator, [facility]-Senior Assessment Program  [facility]  [address]  [place], [place]    [address] [phone] fax  [email or web address]

## 2024-06-17 ENCOUNTER — APPOINTMENT (OUTPATIENT)
Dept: RADIOLOGY | Facility: HOSPITAL | Age: 84
End: 2024-06-17
Payer: MEDICARE

## 2024-06-19 ENCOUNTER — HOSPITAL ENCOUNTER (OUTPATIENT)
Dept: RADIOLOGY | Facility: HOSPITAL | Age: 84
Discharge: HOME | End: 2024-06-19
Payer: MEDICARE

## 2024-06-19 ENCOUNTER — HOSPITAL ENCOUNTER (OUTPATIENT)
Dept: RADIOLOGY | Facility: HOSPITAL | Age: 84
End: 2024-06-19
Payer: MEDICARE

## 2024-06-19 DIAGNOSIS — M81.6 LOCALIZED OSTEOPOROSIS (LEQUESNE): ICD-10-CM

## 2024-06-19 DIAGNOSIS — Z13.820 ENCOUNTER FOR SCREENING FOR OSTEOPOROSIS: ICD-10-CM

## 2024-06-19 PROCEDURE — 77080 DXA BONE DENSITY AXIAL: CPT | Performed by: STUDENT IN AN ORGANIZED HEALTH CARE EDUCATION/TRAINING PROGRAM

## 2024-06-19 PROCEDURE — 77080 DXA BONE DENSITY AXIAL: CPT

## 2024-07-17 ENCOUNTER — APPOINTMENT (OUTPATIENT)
Dept: PRIMARY CARE | Facility: CLINIC | Age: 84
End: 2024-07-17
Payer: MEDICARE

## 2024-09-18 ENCOUNTER — HOSPITAL ENCOUNTER (OUTPATIENT)
Dept: RADIOLOGY | Facility: HOSPITAL | Age: 84
Discharge: HOME | End: 2024-09-18
Payer: MEDICARE

## 2024-09-18 DIAGNOSIS — Z01.89 ENCOUNTER FOR ROUTINE LABORATORY TESTING: ICD-10-CM

## 2024-09-18 DIAGNOSIS — Z13.6 ENCOUNTER FOR SCREENING FOR CARDIOVASCULAR DISORDERS: ICD-10-CM

## 2024-09-18 PROCEDURE — 75571 CT HRT W/O DYE W/CA TEST: CPT

## 2024-09-27 ENCOUNTER — OFFICE VISIT (OUTPATIENT)
Dept: PRIMARY CARE | Facility: CLINIC | Age: 84
End: 2024-09-27
Payer: MEDICARE

## 2024-09-27 VITALS
HEART RATE: 81 BPM | TEMPERATURE: 97.7 F | HEIGHT: 64 IN | WEIGHT: 131 LBS | BODY MASS INDEX: 22.36 KG/M2 | SYSTOLIC BLOOD PRESSURE: 122 MMHG | OXYGEN SATURATION: 98 % | DIASTOLIC BLOOD PRESSURE: 60 MMHG

## 2024-09-27 DIAGNOSIS — H61.23 EXCESSIVE CERUMEN IN EAR CANAL, BILATERAL: Primary | ICD-10-CM

## 2024-09-27 PROCEDURE — 99212 OFFICE O/P EST SF 10 MIN: CPT | Performed by: LICENSED PRACTICAL NURSE

## 2024-09-27 PROCEDURE — 1126F AMNT PAIN NOTED NONE PRSNT: CPT | Performed by: LICENSED PRACTICAL NURSE

## 2024-09-27 PROCEDURE — 3078F DIAST BP <80 MM HG: CPT | Performed by: LICENSED PRACTICAL NURSE

## 2024-09-27 PROCEDURE — 1159F MED LIST DOCD IN RCRD: CPT | Performed by: LICENSED PRACTICAL NURSE

## 2024-09-27 PROCEDURE — 3074F SYST BP LT 130 MM HG: CPT | Performed by: LICENSED PRACTICAL NURSE

## 2024-09-27 PROCEDURE — 1036F TOBACCO NON-USER: CPT | Performed by: LICENSED PRACTICAL NURSE

## 2024-09-27 ASSESSMENT — PAIN SCALES - GENERAL: PAINLEVEL: 0-NO PAIN

## 2024-09-27 ASSESSMENT — PATIENT HEALTH QUESTIONNAIRE - PHQ9
2. FEELING DOWN, DEPRESSED OR HOPELESS: NOT AT ALL
1. LITTLE INTEREST OR PLEASURE IN DOING THINGS: NOT AT ALL
SUM OF ALL RESPONSES TO PHQ9 QUESTIONS 1 AND 2: 0

## 2024-09-27 NOTE — PROGRESS NOTES
[facility]: [facility]  PROGRESS NOTE      Keri Perez is a 84 y.o. female that is presenting today for Follow-up (Trouble hearing out of left ear ).      Subjective   Pt is a 84yr old female who presents to the office today for left ear hearing loss. Ms. Perez has a PMH of subclinical hypothyroidism, CKD 2, hypercholesterolemia, poor short term memory, osteopenia, OA, and HTN. Pt shares that she first experienced left ear hearing loss 2-3 days ago with out incident. Pt denies pain to her ear.       Review of Systems   HENT:  Positive for hearing loss.       Objective   Vitals:    09/27/24 1312   BP: 122/60   Pulse: 81   Temp: 36.5 °C (97.7 °F)   SpO2: 98%      Body mass index is 22.47 kg/m².  Physical Exam  Vitals reviewed.   Constitutional:       General: She is not in acute distress.     Appearance: Normal appearance. She is not ill-appearing, toxic-appearing or diaphoretic.   HENT:      Left Ear: Hearing normal. There is impacted cerumen.      Ears:      Comments: Right ear nearly fully occluded with cerumen.  Left ear full cerumen occlusion.  No pain upon examination.  Cardiovascular:      Rate and Rhythm: Normal rate.   Pulmonary:      Effort: Pulmonary effort is normal. No respiratory distress.      Breath sounds: No stridor. No wheezing, rhonchi or rales.   Skin:     General: Skin is warm and dry.   Neurological:      Mental Status: She is alert.       Diagnostic Results   Lab Results   Component Value Date    GLUCOSE 90 05/28/2024    CALCIUM 9.6 05/28/2024     05/28/2024    K 4.3 05/28/2024    CO2 28 05/28/2024     05/28/2024    BUN 25 05/28/2024    CREATININE 0.90 05/28/2024     Lab Results   Component Value Date    ALT 18 05/28/2024    AST 22 05/28/2024    ALKPHOS 83 05/28/2024    BILITOT 0.8 05/28/2024     Lab Results   Component Value Date    WBC 4.5 05/09/2024    HGB 13.7 05/09/2024    HCT 41.0 05/09/2024    MCV 97 05/09/2024     05/09/2024     Lab Results  "  Component Value Date    CHOL 248 (H) 2024    CHOL 228 (H) 2022    CHOL 214 (H) 2021     Lab Results   Component Value Date    HDL 78.0 2024    HDL 81 2022    HDL 74 2021     Lab Results   Component Value Date    LDLCALC 135 (H) 2024    LDLCALC 126 2022    LDLCALC 118 2021     Lab Results   Component Value Date    TRIG 173 (H) 2024    TRIG 103 2022    TRIG 108 2021     No components found for: \"CHOLHDL\"  Lab Results   Component Value Date    HGBA1C 5.3 2024     Other labs not included in the list above were reviewed either before or during this encounter.    History    Past Medical History:   Diagnosis Date    Dementia (Multi)      History reviewed. No pertinent surgical history.  No family history on file.  Social History     Socioeconomic History    Marital status:      Spouse name: Not on file    Number of children: Not on file    Years of education: Not on file    Highest education level: Not on file   Occupational History    Not on file   Tobacco Use    Smoking status: Former     Current packs/day: 0.00     Types: Cigarettes     Quit date:      Years since quittin.7     Passive exposure: Past    Smokeless tobacco: Former   Vaping Use    Vaping status: Never Used   Substance and Sexual Activity    Alcohol use: Yes     Alcohol/week: 3.0 standard drinks of alcohol     Types: 3 Glasses of wine per week    Drug use: Never    Sexual activity: Not on file   Other Topics Concern    Not on file   Social History Narrative    Not on file     Social Determinants of Health     Financial Resource Strain: Not on file   Food Insecurity: Not on file   Transportation Needs: Not on file   Physical Activity: Not on file   Stress: Not on file   Social Connections: Not on file   Intimate Partner Violence: Not on file   Housing Stability: Not on file     No Known Allergies  Current Outpatient Medications on File Prior to Visit   Medication " Sig Dispense Refill    cholecalciferol (Vitamin D3) 25 MCG (1000 UT) tablet Take 1 tablet (1,000 Units) by mouth once daily.      lisinopril 20 mg tablet Take 1 tablet (20 mg) by mouth once every 24 hours. 30 tablet 11    mv-mn/folic ac/calcium/vit K1 (WOMEN'S 50 PLUS MULTIVITAMIN ORAL) Take by mouth once every 24 hours.       No current facility-administered medications on file prior to visit.     Immunization History   Administered Date(s) Administered    Moderna SARS-CoV-2 Vaccination 01/27/2021, 03/03/2021, 11/11/2021    Pneumococcal conjugate vaccine, 13-valent (PREVNAR 13) 11/04/2014    Pneumococcal polysaccharide vaccine, 23-valent, age 2 years and older (PNEUMOVAX 23) 09/07/2010    Td vaccine, age 7 years and older (TDVAX) 01/02/2006     Patient's medical history was reviewed and updated either before or during this encounter.       Assessment/Plan   Problem List Items Addressed This Visit    None  Visit Diagnoses       Excessive cerumen in ear canal, bilateral    -  Primary    Relevant Medications    carbamide peroxide (Debrox) 6.5 % otic solution          Ms. Perez's exam is Positive for bilateral excessive cerumen, full occlusion to left TM.  No other abnormality noted.  I will order Debrox drops to bilateral ears, patient will return next week for ear lavage.    Henri Hunter, JOHN-CNP

## 2024-10-01 ENCOUNTER — CLINICAL SUPPORT (OUTPATIENT)
Dept: PRIMARY CARE | Facility: CLINIC | Age: 84
End: 2024-10-01
Payer: MEDICARE

## 2024-10-01 DIAGNOSIS — H61.23 BILATERAL IMPACTED CERUMEN: ICD-10-CM

## 2024-10-01 PROCEDURE — 69209 REMOVE IMPACTED EAR WAX UNI: CPT | Mod: 50 | Performed by: LICENSED PRACTICAL NURSE

## 2024-10-01 NOTE — PROGRESS NOTES
Patient ID: Keri Perez is a 84 y.o. female.    Ear Cerumen Removal    Date/Time: 10/1/2024 1:20 PM    Performed by: Cassi Saxena LPN  Authorized by: REBECA Curtis    Consent:     Consent obtained:  Verbal    Consent given by:  Patient    Risks, benefits, and alternatives were discussed: yes      Risks discussed:  Dizziness, pain and incomplete removal  Universal protocol:     Patient identity confirmed:  Verbally with patient  Procedure details:     Location:  L ear and R ear    Procedure type: irrigation      Procedure outcomes: cerumen removed    Post-procedure details:     Inspection:  Ear canal clear    Hearing quality:  Improved    Procedure completion:  Tolerated  Comments:      Pt tolerated irrigation of both ears. Pt states she can hear much better.

## 2024-10-18 ENCOUNTER — OFFICE VISIT (OUTPATIENT)
Dept: PRIMARY CARE | Facility: CLINIC | Age: 84
End: 2024-10-18
Payer: MEDICARE

## 2024-10-18 VITALS
WEIGHT: 129 LBS | DIASTOLIC BLOOD PRESSURE: 82 MMHG | SYSTOLIC BLOOD PRESSURE: 136 MMHG | TEMPERATURE: 97.6 F | OXYGEN SATURATION: 97 % | HEART RATE: 74 BPM | BODY MASS INDEX: 22.13 KG/M2

## 2024-10-18 DIAGNOSIS — N18.2 STAGE 2 CHRONIC KIDNEY DISEASE: Primary | ICD-10-CM

## 2024-10-18 DIAGNOSIS — Z23 IMMUNIZATION DUE: ICD-10-CM

## 2024-10-18 PROCEDURE — 99213 OFFICE O/P EST LOW 20 MIN: CPT | Performed by: STUDENT IN AN ORGANIZED HEALTH CARE EDUCATION/TRAINING PROGRAM

## 2024-10-18 PROCEDURE — 1159F MED LIST DOCD IN RCRD: CPT | Performed by: STUDENT IN AN ORGANIZED HEALTH CARE EDUCATION/TRAINING PROGRAM

## 2024-10-18 PROCEDURE — 1036F TOBACCO NON-USER: CPT | Performed by: STUDENT IN AN ORGANIZED HEALTH CARE EDUCATION/TRAINING PROGRAM

## 2024-10-18 PROCEDURE — 3079F DIAST BP 80-89 MM HG: CPT | Performed by: STUDENT IN AN ORGANIZED HEALTH CARE EDUCATION/TRAINING PROGRAM

## 2024-10-18 PROCEDURE — 1126F AMNT PAIN NOTED NONE PRSNT: CPT | Performed by: STUDENT IN AN ORGANIZED HEALTH CARE EDUCATION/TRAINING PROGRAM

## 2024-10-18 PROCEDURE — 3075F SYST BP GE 130 - 139MM HG: CPT | Performed by: STUDENT IN AN ORGANIZED HEALTH CARE EDUCATION/TRAINING PROGRAM

## 2024-10-18 ASSESSMENT — ENCOUNTER SYMPTOMS
NAUSEA: 0
DYSPHORIC MOOD: 0
SINUS PRESSURE: 0
FATIGUE: 0
CONSTIPATION: 0
SHORTNESS OF BREATH: 0
FEVER: 0
RHINORRHEA: 0
MYALGIAS: 0
DIZZINESS: 0
DYSURIA: 0
FREQUENCY: 0
WHEEZING: 0
POLYDIPSIA: 0
SLEEP DISTURBANCE: 0
WOUND: 0
PALPITATIONS: 0
SINUS PAIN: 0
HEADACHES: 0
VOMITING: 0
COUGH: 0
CHILLS: 0
DIARRHEA: 0
NERVOUS/ANXIOUS: 0
ARTHRALGIAS: 0
LIGHT-HEADEDNESS: 0

## 2024-10-18 ASSESSMENT — PAIN SCALES - GENERAL: PAINLEVEL_OUTOF10: 0-NO PAIN

## 2024-10-18 ASSESSMENT — PATIENT HEALTH QUESTIONNAIRE - PHQ9
1. LITTLE INTEREST OR PLEASURE IN DOING THINGS: NOT AT ALL
SUM OF ALL RESPONSES TO PHQ9 QUESTIONS 1 AND 2: 0
2. FEELING DOWN, DEPRESSED OR HOPELESS: NOT AT ALL

## 2024-10-18 NOTE — PROGRESS NOTES
Subjective   Patient ID: Keri Perez is a 84 y.o. female who presents for Establish Care.     Here today to establish care.  Currently no complaints.  Has history of HTN but this has been managed with lifestyle alone.      States she has been told she likely has dementia, which seems to match her experience.  She often forgets small things, and has to make lists and write things down in order to keep up.  However, she has not had any issues of forgetting to pay bills or having difficulty with ADLs.          Review of Systems   Constitutional:  Negative for chills, fatigue and fever.   HENT:  Negative for congestion, hearing loss, rhinorrhea, sinus pressure, sinus pain and tinnitus.    Eyes:  Negative for visual disturbance.   Respiratory:  Negative for cough, shortness of breath and wheezing.    Cardiovascular:  Negative for chest pain, palpitations and leg swelling.   Gastrointestinal:  Negative for constipation, diarrhea, nausea and vomiting.   Endocrine: Negative for cold intolerance, heat intolerance, polydipsia and polyuria.   Genitourinary:  Negative for dysuria, frequency, menstrual problem, urgency and vaginal discharge.   Musculoskeletal:  Negative for arthralgias and myalgias.   Skin:  Negative for pallor, rash and wound.   Neurological:  Negative for dizziness, light-headedness and headaches.   Psychiatric/Behavioral:  Negative for dysphoric mood and sleep disturbance. The patient is not nervous/anxious.        Objective     Visit Vitals  /82 (BP Location: Left arm)   Pulse 74   Temp 36.4 °C (97.6 °F) (Temporal)   Wt 58.5 kg (129 lb)   SpO2 97%   BMI 22.13 kg/m²   Smoking Status Former   BSA 1.63 m²         Physical Exam  Constitutional:       Appearance: Normal appearance.   HENT:      Head: Normocephalic and atraumatic.      Right Ear: Tympanic membrane, ear canal and external ear normal.      Left Ear: Tympanic membrane, ear canal and external ear normal.      Nose: Nose normal.       Mouth/Throat:      Mouth: Mucous membranes are moist.      Pharynx: Oropharynx is clear.   Eyes:      Extraocular Movements: Extraocular movements intact.      Conjunctiva/sclera: Conjunctivae normal.      Pupils: Pupils are equal, round, and reactive to light.   Cardiovascular:      Rate and Rhythm: Normal rate and regular rhythm.      Pulses: Normal pulses.      Heart sounds: Normal heart sounds.   Pulmonary:      Effort: Pulmonary effort is normal.      Breath sounds: Normal breath sounds.   Abdominal:      General: There is no distension.      Palpations: Abdomen is soft.      Tenderness: There is no abdominal tenderness.   Musculoskeletal:         General: Normal range of motion.      Cervical back: Normal range of motion.   Lymphadenopathy:      Cervical: No cervical adenopathy.   Skin:     General: Skin is warm and dry.   Neurological:      Mental Status: She is alert and oriented to person, place, and time. Mental status is at baseline.   Psychiatric:         Mood and Affect: Mood normal.         Behavior: Behavior normal.         Assessment & Plan  Stage 2 chronic kidney disease    Orders:    CBC; Future    Comprehensive metabolic panel; Future    Albumin-Creatinine Ratio, Urine Random; Future    Immunization due    Orders:    diphth,pertus,acell,,tetanus (BoostRIX) 2.5-8-5 Lf-mcg-Lf/0.5mL injection; Inject 0.5 mL into the muscle 1 time for 1 dose.    zoster vaccine-recombinant adjuvanted (Shingrix) 50 mcg/0.5 mL vaccine; Inject 0.5 mL into the muscle 1 time for 1 dose.  Recommend shingles, TDaP booster, PCV, and annual influenza.  Patient to go to pharmacy since she is covered by Medicare.       Reviewed and approved by AMERICA SCHMITZ on 10/18/24 at 8:32 PM.

## 2024-10-18 NOTE — PATIENT INSTRUCTIONS
We would recommend getting Shingles, TDaP, and pneumonia vaccines.  These can be done at your pharmacy whenever you are ready.

## 2024-10-18 NOTE — ASSESSMENT & PLAN NOTE
Orders:    CBC; Future    Comprehensive metabolic panel; Future    Albumin-Creatinine Ratio, Urine Random; Future

## 2024-12-01 PROBLEM — Z00.00 MEDICARE ANNUAL WELLNESS VISIT, INITIAL: Status: RESOLVED | Noted: 2024-05-30 | Resolved: 2024-12-01

## 2024-12-03 ENCOUNTER — APPOINTMENT (OUTPATIENT)
Dept: PRIMARY CARE | Facility: CLINIC | Age: 84
End: 2024-12-03
Payer: MEDICARE

## 2024-12-06 PROBLEM — F43.23 ADJUSTMENT DISORDER WITH MIXED ANXIETY AND DEPRESSED MOOD: Status: ACTIVE | Noted: 2024-12-06

## 2024-12-11 ENCOUNTER — OFFICE VISIT (OUTPATIENT)
Dept: PRIMARY CARE | Facility: CLINIC | Age: 84
End: 2024-12-11
Payer: MEDICARE

## 2024-12-11 VITALS
HEIGHT: 64 IN | WEIGHT: 132 LBS | OXYGEN SATURATION: 96 % | TEMPERATURE: 97.6 F | DIASTOLIC BLOOD PRESSURE: 84 MMHG | SYSTOLIC BLOOD PRESSURE: 149 MMHG | BODY MASS INDEX: 22.53 KG/M2 | HEART RATE: 77 BPM

## 2024-12-11 DIAGNOSIS — I10 ESSENTIAL HYPERTENSION: ICD-10-CM

## 2024-12-11 DIAGNOSIS — E78.2 MODERATE MIXED HYPERLIPIDEMIA NOT REQUIRING STATIN THERAPY: ICD-10-CM

## 2024-12-11 DIAGNOSIS — N18.2 STAGE 2 CHRONIC KIDNEY DISEASE: ICD-10-CM

## 2024-12-11 DIAGNOSIS — Z76.89 ENCOUNTER TO ESTABLISH CARE: Primary | ICD-10-CM

## 2024-12-11 DIAGNOSIS — M85.80 OSTEOPENIA, UNSPECIFIED LOCATION: ICD-10-CM

## 2024-12-11 DIAGNOSIS — F09 MILD COGNITIVE DISORDER: ICD-10-CM

## 2024-12-11 DIAGNOSIS — R73.02 IMPAIRED GLUCOSE TOLERANCE: ICD-10-CM

## 2024-12-11 DIAGNOSIS — E03.8 SUBCLINICAL HYPOTHYROIDISM: ICD-10-CM

## 2024-12-11 PROCEDURE — 1159F MED LIST DOCD IN RCRD: CPT | Performed by: NURSE PRACTITIONER

## 2024-12-11 PROCEDURE — 99214 OFFICE O/P EST MOD 30 MIN: CPT | Performed by: NURSE PRACTITIONER

## 2024-12-11 PROCEDURE — 3079F DIAST BP 80-89 MM HG: CPT | Performed by: NURSE PRACTITIONER

## 2024-12-11 PROCEDURE — 3077F SYST BP >= 140 MM HG: CPT | Performed by: NURSE PRACTITIONER

## 2024-12-11 PROCEDURE — 1126F AMNT PAIN NOTED NONE PRSNT: CPT | Performed by: NURSE PRACTITIONER

## 2024-12-11 ASSESSMENT — ENCOUNTER SYMPTOMS
PSYCHIATRIC NEGATIVE: 1
CONSTITUTIONAL NEGATIVE: 1
GASTROINTESTINAL NEGATIVE: 1
RESPIRATORY NEGATIVE: 1
CARDIOVASCULAR NEGATIVE: 1

## 2024-12-11 ASSESSMENT — PATIENT HEALTH QUESTIONNAIRE - PHQ9
1. LITTLE INTEREST OR PLEASURE IN DOING THINGS: NOT AT ALL
2. FEELING DOWN, DEPRESSED OR HOPELESS: NOT AT ALL
SUM OF ALL RESPONSES TO PHQ9 QUESTIONS 1 AND 2: 0

## 2024-12-11 ASSESSMENT — PAIN SCALES - GENERAL: PAINLEVEL_OUTOF10: 0-NO PAIN

## 2024-12-11 NOTE — ASSESSMENT & PLAN NOTE
Daily puzzles and brain exercises to help memory  Last MRI 5/22/2024  1. Findings of probable chronic small vessel ischemic changes, mildly  progressed since the prior MRI.  2. Age related mild diffuse cerebral volume loss.  Discussed referral for further testing and evaluations today but patient declined  Will place referral to case management for the patient today - concerns about memory

## 2024-12-11 NOTE — PROGRESS NOTES
[facility]: [facility]  PROGRESS NOTE      Keri Perez is a 84 y.o. female that is presenting today to establish care with new provider.  She is a former [facility] patient.  She did see Rajesh Kinsey on 10/18/2024 but is seeking a new provider.  She has a PMH of Hyperlipidemia, HTN, Subclinical Hypothyroidism, Elevated Blood Sugar, CKD 2, ETOH Abuse, Osteoarthritis, Osteopenia, Mild Cognitive Disorder, Anxiety / Depression, Poor short term memory.  The patient has no concerns today.,  States she still drives, lives alone and does make lists to remember what she has to do    Assessment/Plan   Assessment & Plan  Encounter to establish care  Had flu shot this year  Medications and medical conditions reconciled today with the patient    Essential hypertension  BP Stable  On no medications  Cmp normal 5/28/24    Orders:    Comprehensive Metabolic Panel; Future    CBC; Future    Moderate mixed hyperlipidemia not requiring statin therapy  Elevated cholesterol, LDL and triglycerides  Diet changes and daily exercise discusses    Orders:    Lipid Panel; Future    Stage 2 chronic kidney disease  Kidney function normal 5/28/24    Orders:    Hemoglobin A1C; Future    Osteopenia, unspecified location  Weight bearing exercises  Continue vitamin d3  Last dexa scan 6/19/2024    Orders:    Vitamin D 25-Hydroxy,Total (for eval of Vitamin D levels); Future    Mild cognitive disorder  Daily puzzles and brain exercises to help memory  Last MRI 5/22/2024  1. Findings of probable chronic small vessel ischemic changes, mildly  progressed since the prior MRI.  2. Age related mild diffuse cerebral volume loss.  Discussed referral for further testing and evaluations today but patient declined  Will place referral to case management for the patient today - concerns about memory     Subclinical hypothyroidism    Orders:    Thyroid Stimulating Hormone; Future    Impaired glucose tolerance    Orders:    Hemoglobin A1C;  Future      Subjective   HPI  Review of Systems   Constitutional: Negative.    Respiratory: Negative.     Cardiovascular: Negative.    Gastrointestinal: Negative.    Neurological:         Short term memory issues   Psychiatric/Behavioral: Negative.        Objective   There were no vitals filed for this visit.   There is no height or weight on file to calculate BMI.  Physical Exam  Vitals reviewed.   Constitutional:       Appearance: Normal appearance.   Cardiovascular:      Rate and Rhythm: Normal rate and regular rhythm.      Pulses: Normal pulses.      Heart sounds: Normal heart sounds.   Pulmonary:      Effort: Pulmonary effort is normal.      Breath sounds: Normal breath sounds.   Abdominal:      General: Abdomen is flat. Bowel sounds are normal.      Palpations: Abdomen is soft.   Skin:     General: Skin is warm and dry.   Neurological:      Mental Status: She is alert and oriented to person, place, and time.      Comments: Short term memory issues today. Unsure why she was here   Psychiatric:         Mood and Affect: Mood normal.         Behavior: Behavior normal.         Thought Content: Thought content normal.         Judgment: Judgment normal.       Vitals:    12/11/24 1111   BP: 149/84   Pulse: 77   Temp: 36.4 °C (97.6 °F)   SpO2: 96%       Diagnostic Results   Lab Results   Component Value Date    GLUCOSE 90 05/28/2024    CALCIUM 9.6 05/28/2024     05/28/2024    K 4.3 05/28/2024    CO2 28 05/28/2024     05/28/2024    BUN 25 05/28/2024    CREATININE 0.90 05/28/2024     Lab Results   Component Value Date    ALT 18 05/28/2024    AST 22 05/28/2024    ALKPHOS 83 05/28/2024    BILITOT 0.8 05/28/2024     Lab Results   Component Value Date    WBC 4.5 05/09/2024    HGB 13.7 05/09/2024    HCT 41.0 05/09/2024    MCV 97 05/09/2024     05/09/2024     Lab Results   Component Value Date    CHOL 248 (H) 05/28/2024    CHOL 228 (H) 05/23/2022    CHOL 214 (H) 05/21/2021     Lab Results   Component Value  "Date    HDL 78.0 2024    HDL 81 2022    HDL 74 2021     Lab Results   Component Value Date    LDLCALC 135 (H) 2024    LDLCALC 126 2022    LDLCALC 118 2021     Lab Results   Component Value Date    TRIG 173 (H) 2024    TRIG 103 2022    TRIG 108 2021     No components found for: \"CHOLHDL\"  Lab Results   Component Value Date    HGBA1C 5.3 2024     Other labs not included in the list above were reviewed either before or during this encounter.    History    Past Medical History:   Diagnosis Date    Dementia      No past surgical history on file.  No family history on file.  Social History     Socioeconomic History    Marital status:      Spouse name: Not on file    Number of children: Not on file    Years of education: Not on file    Highest education level: Not on file   Occupational History    Not on file   Tobacco Use    Smoking status: Former     Current packs/day: 0.00     Types: Cigarettes     Quit date:      Years since quittin.9     Passive exposure: Past    Smokeless tobacco: Former   Vaping Use    Vaping status: Never Used   Substance and Sexual Activity    Alcohol use: Yes     Alcohol/week: 3.0 standard drinks of alcohol     Types: 3 Glasses of wine per week    Drug use: Never    Sexual activity: Not on file   Other Topics Concern    Not on file   Social History Narrative    Not on file     Social Drivers of Health     Financial Resource Strain: Not on file   Food Insecurity: Not on file   Transportation Needs: Not on file   Physical Activity: Not on file   Stress: Not on file   Social Connections: Not on file   Intimate Partner Violence: Not on file   Housing Stability: Not on file     No Known Allergies  Current Outpatient Medications on File Prior to Visit   Medication Sig Dispense Refill    cholecalciferol (Vitamin D3) 25 MCG (1000 UT) tablet Take 1 tablet (1,000 Units) by mouth once daily.      mv-mn/folic ac/calcium/vit K1 " (WOMEN'S 50 PLUS MULTIVITAMIN ORAL) Take by mouth once every 24 hours.       No current facility-administered medications on file prior to visit.     Immunization History   Administered Date(s) Administered    Moderna SARS-CoV-2 Vaccination 01/27/2021, 03/03/2021, 11/11/2021    Pneumococcal conjugate vaccine, 13-valent (PREVNAR 13) 11/04/2014    Pneumococcal polysaccharide vaccine, 23-valent, age 2 years and older (PNEUMOVAX 23) 09/07/2010    Td vaccine, age 7 years and older (TDVAX) 01/02/2006     Patient's medical history was reviewed and updated either before or during this encounter.       Cheryl Hagne, APRN-CNP

## 2024-12-11 NOTE — ASSESSMENT & PLAN NOTE
Weight bearing exercises  Continue vitamin d3  Last dexa scan 6/19/2024    Orders:    Vitamin D 25-Hydroxy,Total (for eval of Vitamin D levels); Future

## 2024-12-11 NOTE — ASSESSMENT & PLAN NOTE
Elevated cholesterol, LDL and triglycerides  Diet changes and daily exercise discusses    Orders:    Lipid Panel; Future

## 2024-12-11 NOTE — ASSESSMENT & PLAN NOTE
BP Stable  On no medications  Cmp normal 5/28/24    Orders:    Comprehensive Metabolic Panel; Future    CBC; Future

## 2024-12-12 NOTE — PROGRESS NOTES
Referral received from Provider due to concerns for cognitive status.  PMH of Hyperlipidemia, HTN, Subclinical Hypothyroidism, Elevated Blood Sugar, CKD 2, ETOH Abuse, Osteoarthritis, Osteopenia, Mild Cognitive Disorder, Anxiety / Depression, Poor short term memory.

## 2024-12-30 ENCOUNTER — PATIENT OUTREACH (OUTPATIENT)
Dept: PRIMARY CARE | Facility: CLINIC | Age: 84
End: 2024-12-30
Payer: MEDICARE

## 2025-01-02 ENCOUNTER — PATIENT OUTREACH (OUTPATIENT)
Dept: PRIMARY CARE | Facility: CLINIC | Age: 85
End: 2025-01-02
Payer: MEDICARE

## 2025-01-02 DIAGNOSIS — E78.2 MODERATE MIXED HYPERLIPIDEMIA NOT REQUIRING STATIN THERAPY: ICD-10-CM

## 2025-01-02 DIAGNOSIS — F09 MILD COGNITIVE DISORDER: ICD-10-CM

## 2025-01-02 DIAGNOSIS — I10 ESSENTIAL HYPERTENSION: ICD-10-CM

## 2025-01-02 NOTE — PROGRESS NOTES
Patient introduced to Chronic Care Management Services   Patient opted into services on 1/2/25  Services will be performed under the direction of Cheryl Hagen CNP  Patient has planned (AWV or Follow-up) on 5/14/25  POC will be derived from provider's comprehensive assessment and outlined plan of care found in AWV or other follow up.  I have discussed the nature and availability of the service with the patient, the patient's responsibility for potential cost sharing, only one practitioner furnishing services during a calendar month, and the right to stop services at any time.   Patient has a hx of HTN, HLP, mild cognitive disorder, OA  She is accepting of case management services. She reports she lives alone, has children in the area, also supported by good neighbors and friends. States she does have poor memory- but was able to state her meds she is taking, and states compliance with them.  She states she tries to remain active- goes out for a walk in her neighborhood. States she also tries to manage her own yard work as she has 2 acres to manage. She denies any falls, states is ambulating without a device and still drives. States she just drives locally- to the grocery store and pharmacy and bank.   We did discuss a LW/ DPOA and she states she will discuss these with her children and she was made aware we do have the standard [place] forms should she need them, I can mail them out to her. She will let me know.  At this time, she reports she is able to do her shopping, prepare light meals- spoke to [facility]- but at this time, refusing MOW. Will reassess next call also.   She was provided my contact number, she did write it down on her notebook. She is accepting monthly outreaches and encouraged to call with questions or concerns.

## 2025-02-03 ENCOUNTER — DOCUMENTATION (OUTPATIENT)
Dept: PRIMARY CARE | Facility: CLINIC | Age: 85
End: 2025-02-03
Payer: MEDICARE

## 2025-02-05 ENCOUNTER — PATIENT OUTREACH (OUTPATIENT)
Dept: PRIMARY CARE | Facility: CLINIC | Age: 85
End: 2025-02-05
Payer: MEDICARE

## 2025-02-05 DIAGNOSIS — I10 ESSENTIAL HYPERTENSION: ICD-10-CM

## 2025-02-05 DIAGNOSIS — E78.2 MODERATE MIXED HYPERLIPIDEMIA NOT REQUIRING STATIN THERAPY: ICD-10-CM

## 2025-02-05 NOTE — PROGRESS NOTES
Called patient for monthly CCM outreach after chart review. Patient did not answer, left a voicemail with my return contact information.  Patient returned my call at [phone]  PMH:  Hypertension, hyperlipidemia,  and mild cognitive disorder    General:  Patient states is general she has been okay. Patient states she lives alone. Patient states her  passed away many years ago. Patient states she has 2 children, one lives in [place] and the other in [place]. Patient states her daughter is especially helpful. Patient still able to do yard work. 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 Patient states she reads and does crossword puzzles daily. Patient states she is still able to drive. Patient taking medications as prescribed.    Cardiovascular:  Patient has a history of hypertension. Patient is not on a blood pressure medication at this time. Patient does not take blood pressure daily. Patient does not own a blood pressure machine at this time.    Ambulation:   Patient states she does not use any assistive devices for ambulation. Patient states she has not had any recent falls. Patient states she walks for about 30 minutes outside each day. Patient has a sizeable yard that she cares for.     Appetite/Intake/Weight:   Patient states her appetite is good. Patient states she eats about 3 meals a day, but states she is not much of a cook. Patient states for breakfast she normally eats cereal, for lunch a sandwich and soup, and for dinner she will prepare something or eat a pre-made meal from the store. Patient states she does her own grocery shopping.    Pain:  Patient states she has no pain.    Sleep:   Patient states she sleeps okay. Patient states she usually goes to bed around 10pm and sleeps until about 7-9am.    Education/Needs:   Reviewed possible needs from RN care manager's note last month. Patient in agreement to receive DPOA forms in the mail. I mentioned the meals on wheels program and  patient declines at this time. Patient wrote down my contact information and I encouraged her to call me with anything I may assist with. Patient in agreement with continued monthly CCM outreach.

## 2025-03-05 ENCOUNTER — PATIENT OUTREACH (OUTPATIENT)
Dept: PRIMARY CARE | Facility: CLINIC | Age: 85
End: 2025-03-05
Payer: MEDICARE

## 2025-03-05 DIAGNOSIS — I10 ESSENTIAL HYPERTENSION: ICD-10-CM

## 2025-03-05 DIAGNOSIS — E78.2 MODERATE MIXED HYPERLIPIDEMIA NOT REQUIRING STATIN THERAPY: ICD-10-CM

## 2025-03-05 NOTE — PROGRESS NOTES
"Called patient for monthly CCM outreach after chart review.   PMH:  Hypertension, hyperlipidemia,  and mild cognitive disorder    General:  Patient states is general she has been good and has no complaints. Patient reviewed that she continues to live alone and has 2 adult children that live relatively close. Patient does not take any prescribed medications at this time. Patient states she is getting together with her neighbors today. Patient states she remains active and is \"catina\" to be as healthy as she is.    Cardiovascular:  Patient has a history of hypertension. Patient is not on a blood pressure medication at this time. Patient does not take blood pressure daily. Patient does not own a blood pressure machine at this time.    Ambulation:   Patient states she does not use any assistive devices for ambulation. Patient denies any recent falls. Patient states she is continues to do her daily outdoor walks for about 30 minutes. Patient also continues to care for her home on her own.    Appetite/Intake/Weight:   Patient states her appetite continues to be good. Patient states she does try to eat 3 meals a day. Patient does not follow a specific diet, but tries to eat well balanced meals.    Pain:  Patient states she has no pain.    Sleep:   Patient states she sleeps okay. Patient states she usually goes to bed around 10pm and sleeps until about 7-9am.    Education/Needs:   Patient states she has no needs at this time. I reviewed my role as care manager with the patient. Patient wrote down my contact information again and I encouraged her to call me with anything I may assist with. Patient in agreement with continued monthly CCM outreach.   "

## 2025-04-02 ENCOUNTER — PATIENT OUTREACH (OUTPATIENT)
Dept: PRIMARY CARE | Facility: CLINIC | Age: 85
End: 2025-04-02
Payer: MEDICARE

## 2025-04-02 DIAGNOSIS — E78.2 MODERATE MIXED HYPERLIPIDEMIA NOT REQUIRING STATIN THERAPY: ICD-10-CM

## 2025-04-02 DIAGNOSIS — I10 ESSENTIAL HYPERTENSION: ICD-10-CM

## 2025-04-02 NOTE — PROGRESS NOTES
Called patient for monthly CCM outreach after chart review.   PMH:  Hypertension, hyperlipidemia,  and mild cognitive disorder    General:  Patient states is general she has been good and has no complaints. Patient reviewed that she continues to live alone and has 2 adult children that live relatively close. Patient does not take any prescribed medications at this time, we reviewed the vitamins she is taking. Patient remarks she feels catina and hasn't had any illnesses as of late. We reviewed her PCP appointment on 5/30/2025. Patient states she has no needs at this time.     Cardiovascular:  Patient has a history of hypertension. Patient is not on a blood pressure medication at this time. Patient does not take blood pressure daily. Patient does not own a blood pressure machine at this time.    Ambulation:   Patient states she does not use any assistive devices for ambulation. Patient denies any recent falls. Patient states she is continues to do her daily outdoor walks for about 30-45 minutes. Patient continues to do her own housework and yard work.    Appetite/Intake/Weight:   Patient states her appetite continues to be good. Patient states she does try to eat 3 meals a day. Patient does not follow a specific diet, but tries to eat well balanced meals.    Pain:  Patient states she has no pain.    Sleep:   Patient states she has been sleeping good. Patient denies complaints regarding sleep.    Education/Needs:   Patient states she has no needs at this time. Patient has my contact information again and I encouraged her to call me with anything I may assist with. Patient in agreement with continued monthly CCM outreach.

## 2025-05-01 ENCOUNTER — DOCUMENTATION (OUTPATIENT)
Dept: PRIMARY CARE | Facility: CLINIC | Age: 85
End: 2025-05-01
Payer: MEDICARE

## 2025-05-01 NOTE — PROGRESS NOTES
Reviewed chart in collaboration with LPN.  Care plan updated for relative chronic health conditions

## 2025-05-05 ENCOUNTER — PATIENT OUTREACH (OUTPATIENT)
Dept: PRIMARY CARE | Facility: CLINIC | Age: 85
End: 2025-05-05
Payer: MEDICARE

## 2025-05-05 DIAGNOSIS — F09 MILD COGNITIVE DISORDER: ICD-10-CM

## 2025-05-05 DIAGNOSIS — I10 ESSENTIAL HYPERTENSION: ICD-10-CM

## 2025-05-05 DIAGNOSIS — E78.2 MODERATE MIXED HYPERLIPIDEMIA NOT REQUIRING STATIN THERAPY: ICD-10-CM

## 2025-05-05 NOTE — PROGRESS NOTES
Care Management Monthly Outreach  Chart review completed  Confirmation of at least 2 patient identifiers  Change in insurance? No    Has patient been to ER/Urgent Care since last outreach? No    Last Office Visit with PCP: 12/11/2024   Next Office Visit with PCP: 5/30/2025   APC Collaboration: n/a    Chronic Conditions and Outreach Summary:   Essential hypertension    Moderate mixed hyperlipidemia not requiring statin therapy    Mild cognitive disorder  Patient continues to be very active. Patient goes on a daily 30 minute walk. Patient has family that lives nearby and helps as needed. Patient states she is continuing to notice memory problems and has to write everything down. Reviewed upcoming appointment and confirmed patient has the date and time correct on her calendar. Patient does have a few care gaps to be addressed at her PCP appointment including Medicare wellness visit, TSH level, and Tdap vaccine.    Medications:   Are there medication changes since last visit? No  Refills needed? No    Social Drivers of Health: Complete  Care Gaps Addressed? Needs Updated  Care Plan addressed: Yes    Upcoming Appointments:   Future Appointments       Date / Time Provider Department Dept Phone    5/30/2025 10:30 AM (Arrive by 10:15 AM) JOHN Tapia-CNP [facility]           Blood Pressures Reviewed  BP Readings from Last 3 Encounters:   12/11/24 149/84   10/18/24 136/82   09/27/24 122/60     Labs Reviewed:  Lab Results   Component Value Date    CREATININE 0.90 05/28/2024    GLUCOSE 90 05/28/2024    ALKPHOS 83 05/28/2024    K 4.3 05/28/2024    PROT 7.2 05/28/2024     05/28/2024    CALCIUM 9.6 05/28/2024    AST 22 05/28/2024    ALT 18 05/28/2024    BUN 25 05/28/2024     Lab Results   Component Value Date    TRIG 173 (H) 05/28/2024    CHOL 248 (H) 05/28/2024    LDLCALC 135 (H) 05/28/2024    HDL 78.0 05/28/2024     Lab Results   Component Value Date    HGBA1C 5.3 05/28/2024     Lab Results    Component Value Date    WBC 4.5 05/09/2024    RBC 4.25 05/09/2024    HGB 13.7 05/09/2024     05/09/2024   No other concerns at this time.  Agreeable to continue monthly outreaches.  Encouraged to call if questions or concerns arise.    Radha Lepe LPN Care Manager

## 2025-05-14 ENCOUNTER — APPOINTMENT (OUTPATIENT)
Dept: PRIMARY CARE | Facility: CLINIC | Age: 85
End: 2025-05-14
Payer: MEDICARE

## 2025-05-29 NOTE — ASSESSMENT & PLAN NOTE
CT scn 5/23/2024  IMPRESSION:  1. Findings of probable chronic small vessel ischemic changes, mildly  progressed since the prior MRI.  2. Age related mild diffuse cerebral volume loss.  Followed by case management JOCELYNE Lepe LPN

## 2025-05-29 NOTE — PROGRESS NOTES
[facility]: [facility]  MEDICARE WELLNESS EXAM      Keri Perez is a 84 y.o. female that is presenting today for annual medicare wellness exam and review of medical conditions. Pt notes doing well, stays active by walking every day. Short term memory noticed to be an ongoing issues. No overt concerns by the patient.     Assessment/Plan    Assessment & Plan  Encounter for Medicare annual wellness exam  Medications and problem list reconciled today    Dexa scan 6/19/2024  Needs labs will go after appt today  Declined mammogram  Declined t-Dap today        Essential hypertension  Bp stable       Moderate mixed hyperlipidemia not requiring statin therapy  Due for lipid panel       Subclinical hypothyroidism  Due for tsh today       Osteopenia, unspecified location  Dexa scan 6/19/2024       Stage 2 chronic kidney disease  Will check cmp today       Mild cognitive disorder  CT scn 5/23/2024  IMPRESSION:  1. Findings of probable chronic small vessel ischemic changes, mildly  progressed since the prior MRI.  2. Age related mild diffuse cerebral volume loss.  Followed by case management JOCELYNE Lepe LPN       Routine general medical examination at health care facility    Orders:    1 Year Follow Up In Primary Care - Wellness Exam; Future      ADVANCED CARE PLANNING  Advanced Care Planning was discussed with patient:    Encouraged the patient to confirm that Living Will and Healthcare Power of  (HCPoA) are accurate and up to date.  Encouraged the patient to confirm that our office be provided a copy of any documentation in the event that anything changes.    ACTIVITIES OF DAILY LIVING  Basic ADLs:  Bathing: Independent, Dressing: Independent, Toileting: Independent, Transferring: Independent, Continence: Independent, Feeding: Independent.    Instrumental ADLs:  Ability to use phone: Independent, Shopping: Independent, Cooking: Independent, House-keeping: Independent, Laundry: Independent,  Transportation: Independent, Medication Management: Independent, Finance Management: Independent.    Subjective   HPI  Review of Systems   Constitutional: Negative.  Negative for chills and fever.   HENT: Negative.     Eyes: Negative.    Respiratory: Negative.     Cardiovascular: Negative.  Negative for chest pain.   Gastrointestinal: Negative.    Endocrine: Negative.    Genitourinary: Negative.    Musculoskeletal: Negative.    Skin: Negative.    Allergic/Immunologic: Negative.    Neurological: Negative.         Memory issues   Hematological: Negative.    Psychiatric/Behavioral: Negative.       Objective   Vitals:    05/30/25 1036   BP: 128/88   Pulse: 82   Temp: 35.9 °C (96.6 °F)   SpO2: 98%      Manual BP in Clinic: 132/82 sitting left arm    Physical Exam  Vitals reviewed.   Constitutional:       Appearance: Normal appearance.   HENT:      Head: Normocephalic and atraumatic.      Right Ear: Tympanic membrane normal.      Left Ear: Tympanic membrane normal.      Nose: Nose normal.      Mouth/Throat:      Mouth: Mucous membranes are moist.   Eyes:      Extraocular Movements: Extraocular movements intact.      Pupils: Pupils are equal, round, and reactive to light.   Cardiovascular:      Rate and Rhythm: Normal rate and regular rhythm.      Pulses: Normal pulses.      Heart sounds: Normal heart sounds.   Pulmonary:      Effort: Pulmonary effort is normal.      Breath sounds: Normal breath sounds. No wheezing.   Abdominal:      General: Abdomen is flat. Bowel sounds are normal.      Palpations: Abdomen is soft.   Musculoskeletal:         General: Normal range of motion.      Cervical back: Normal range of motion.   Skin:     General: Skin is warm and dry.      Capillary Refill: Capillary refill takes less than 2 seconds.   Neurological:      General: No focal deficit present.      Mental Status: She is alert and oriented to person, place, and time.   Psychiatric:         Mood and Affect: Mood normal.          "Behavior: Behavior normal.         Thought Content: Thought content normal.         Judgment: Judgment normal.       Diagnostic Results   Lab Results   Component Value Date    GLUCOSE 90 2024    CALCIUM 9.6 2024     2024    K 4.3 2024    CO2 28 2024     2024    BUN 25 2024    CREATININE 0.90 2024     Lab Results   Component Value Date    ALT 18 2024    AST 22 2024    ALKPHOS 83 2024    BILITOT 0.8 2024     Lab Results   Component Value Date    WBC 4.5 2024    HGB 13.7 2024    HCT 41.0 2024    MCV 97 2024     2024     Lab Results   Component Value Date    CHOL 248 (H) 2024    CHOL 228 (H) 2022    CHOL 214 (H) 2021     Lab Results   Component Value Date    HDL 78.0 2024    HDL 81 2022    HDL 74 2021     Lab Results   Component Value Date    LDLCALC 135 (H) 2024    LDLCALC 126 2022    LDLCALC 118 2021     Lab Results   Component Value Date    TRIG 173 (H) 2024    TRIG 103 2022    TRIG 108 2021     No components found for: \"CHOLHDL\"  Lab Results   Component Value Date    HGBA1C 5.3 2024     Other labs not included in the list above reviewed either before or during this encounter.    History   Medical History[1]  Surgical History[2]  Family History[3]  Social History     Socioeconomic History    Marital status:      Spouse name: Not on file    Number of children: Not on file    Years of education: Not on file    Highest education level: Not on file   Occupational History    Not on file   Tobacco Use    Smoking status: Former     Current packs/day: 0.00     Types: Cigarettes     Quit date:      Years since quittin.4     Passive exposure: Past    Smokeless tobacco: Former   Vaping Use    Vaping status: Never Used   Substance and Sexual Activity    Alcohol use: Yes     Alcohol/week: 3.0 standard drinks of alcohol "     Types: 3 Glasses of wine per week    Drug use: Never    Sexual activity: Not on file   Other Topics Concern    Not on file   Social History Narrative    Not on file     Social Drivers of Health     Financial Resource Strain: Not on file   Food Insecurity: Not on file   Transportation Needs: Not on file   Physical Activity: Not on file   Stress: Not on file   Social Connections: Not on file   Intimate Partner Violence: Not on file   Housing Stability: Not on file     Allergies[4]  Medications Ordered Prior to Encounter[5]  Immunization History   Administered Date(s) Administered    Moderna SARS-CoV-2 Vaccination 01/27/2021, 03/03/2021, 11/11/2021    Pneumococcal conjugate vaccine, 13-valent (PREVNAR 13) 11/04/2014    Pneumococcal polysaccharide vaccine, 23-valent, age 2 years and older (PNEUMOVAX 23) 09/07/2010    Td vaccine, age 7 years and older (TDVAX) 01/02/2006     Patient's medical history was reviewed and updated either before or during this encounter.     Cheryl Hagen, APRN-CNP         [1]   Past Medical History:  Diagnosis Date    Dementia    [2] No past surgical history on file.  [3] No family history on file.  [4] No Known Allergies  [5]   Current Outpatient Medications on File Prior to Visit   Medication Sig Dispense Refill    cholecalciferol (Vitamin D3) 25 MCG (1000 UT) tablet Take 1 tablet (1,000 Units) by mouth once daily.      mv-mn/folic ac/calcium/vit K1 (WOMEN'S 50 PLUS MULTIVITAMIN ORAL) Take by mouth once every 24 hours.       No current facility-administered medications on file prior to visit.

## 2025-05-30 ENCOUNTER — OFFICE VISIT (OUTPATIENT)
Dept: PRIMARY CARE | Facility: CLINIC | Age: 85
End: 2025-05-30
Payer: MEDICARE

## 2025-05-30 VITALS
OXYGEN SATURATION: 98 % | HEART RATE: 82 BPM | DIASTOLIC BLOOD PRESSURE: 88 MMHG | WEIGHT: 135 LBS | TEMPERATURE: 96.6 F | BODY MASS INDEX: 23.05 KG/M2 | HEIGHT: 64 IN | SYSTOLIC BLOOD PRESSURE: 128 MMHG

## 2025-05-30 DIAGNOSIS — F09 MILD COGNITIVE DISORDER: ICD-10-CM

## 2025-05-30 DIAGNOSIS — M85.80 OSTEOPENIA, UNSPECIFIED LOCATION: ICD-10-CM

## 2025-05-30 DIAGNOSIS — E78.2 MODERATE MIXED HYPERLIPIDEMIA NOT REQUIRING STATIN THERAPY: ICD-10-CM

## 2025-05-30 DIAGNOSIS — I10 ESSENTIAL HYPERTENSION: ICD-10-CM

## 2025-05-30 DIAGNOSIS — N18.2 STAGE 2 CHRONIC KIDNEY DISEASE: ICD-10-CM

## 2025-05-30 DIAGNOSIS — Z00.00 ENCOUNTER FOR MEDICARE ANNUAL WELLNESS EXAM: Primary | ICD-10-CM

## 2025-05-30 DIAGNOSIS — Z00.00 ROUTINE GENERAL MEDICAL EXAMINATION AT HEALTH CARE FACILITY: ICD-10-CM

## 2025-05-30 DIAGNOSIS — E03.8 SUBCLINICAL HYPOTHYROIDISM: ICD-10-CM

## 2025-05-30 PROBLEM — D22.9 ATYPICAL MOLE: Status: RESOLVED | Noted: 2024-05-30 | Resolved: 2025-05-30

## 2025-05-30 PROBLEM — L72.9 CYST OF SKIN: Status: RESOLVED | Noted: 2024-05-30 | Resolved: 2025-05-30

## 2025-05-30 PROCEDURE — G0439 PPPS, SUBSEQ VISIT: HCPCS | Performed by: NURSE PRACTITIONER

## 2025-05-30 PROCEDURE — 3079F DIAST BP 80-89 MM HG: CPT | Performed by: NURSE PRACTITIONER

## 2025-05-30 PROCEDURE — 1036F TOBACCO NON-USER: CPT | Performed by: NURSE PRACTITIONER

## 2025-05-30 PROCEDURE — 99215 OFFICE O/P EST HI 40 MIN: CPT | Performed by: NURSE PRACTITIONER

## 2025-05-30 PROCEDURE — 1160F RVW MEDS BY RX/DR IN RCRD: CPT | Performed by: NURSE PRACTITIONER

## 2025-05-30 PROCEDURE — 1159F MED LIST DOCD IN RCRD: CPT | Performed by: NURSE PRACTITIONER

## 2025-05-30 PROCEDURE — 1126F AMNT PAIN NOTED NONE PRSNT: CPT | Performed by: NURSE PRACTITIONER

## 2025-05-30 PROCEDURE — 3074F SYST BP LT 130 MM HG: CPT | Performed by: NURSE PRACTITIONER

## 2025-05-30 ASSESSMENT — ENCOUNTER SYMPTOMS
CARDIOVASCULAR NEGATIVE: 1
CHILLS: 0
MUSCULOSKELETAL NEGATIVE: 1
RESPIRATORY NEGATIVE: 1
HEMATOLOGIC/LYMPHATIC NEGATIVE: 1
CONSTITUTIONAL NEGATIVE: 1
EYES NEGATIVE: 1
FEVER: 0
ENDOCRINE NEGATIVE: 1
PSYCHIATRIC NEGATIVE: 1
NEUROLOGICAL NEGATIVE: 1
ALLERGIC/IMMUNOLOGIC NEGATIVE: 1
GASTROINTESTINAL NEGATIVE: 1

## 2025-05-30 ASSESSMENT — PAIN SCALES - GENERAL: PAINLEVEL_OUTOF10: 0-NO PAIN

## 2025-05-30 ASSESSMENT — PATIENT HEALTH QUESTIONNAIRE - PHQ9
SUM OF ALL RESPONSES TO PHQ9 QUESTIONS 1 AND 2: 0
2. FEELING DOWN, DEPRESSED OR HOPELESS: NOT AT ALL
1. LITTLE INTEREST OR PLEASURE IN DOING THINGS: NOT AT ALL

## 2025-05-31 LAB
25(OH)D3+25(OH)D2 SERPL-MCNC: 67 NG/ML (ref 30–100)
CHOLEST SERPL-MCNC: 282 MG/DL
CHOLEST/HDLC SERPL: 3.4 (CALC)
ERYTHROCYTE [DISTWIDTH] IN BLOOD BY AUTOMATED COUNT: 13.5 % (ref 11–15)
EST. AVERAGE GLUCOSE BLD GHB EST-MCNC: 103 MG/DL
EST. AVERAGE GLUCOSE BLD GHB EST-SCNC: 5.7 MMOL/L
HBA1C MFR BLD: 5.2 %
HCT VFR BLD AUTO: 42.8 % (ref 35–45)
HDLC SERPL-MCNC: 83 MG/DL
HGB BLD-MCNC: 14.6 G/DL (ref 11.7–15.5)
LDLC SERPL CALC-MCNC: 164 MG/DL (CALC)
MCH RBC QN AUTO: 33.4 PG (ref 27–33)
MCHC RBC AUTO-ENTMCNC: 34.1 G/DL (ref 32–36)
MCV RBC AUTO: 97.9 FL (ref 80–100)
NONHDLC SERPL-MCNC: 199 MG/DL (CALC)
PLATELET # BLD AUTO: 190 THOUSAND/UL (ref 140–400)
PMV BLD REES-ECKER: 10.6 FL (ref 7.5–12.5)
RBC # BLD AUTO: 4.37 MILLION/UL (ref 3.8–5.1)
TRIGL SERPL-MCNC: 193 MG/DL
TSH SERPL-ACNC: 3.48 MIU/L (ref 0.4–4.5)
WBC # BLD AUTO: 4.8 THOUSAND/UL (ref 3.8–10.8)

## 2025-06-02 ENCOUNTER — PATIENT OUTREACH (OUTPATIENT)
Dept: PRIMARY CARE | Facility: CLINIC | Age: 85
End: 2025-06-02
Payer: MEDICARE

## 2025-06-02 DIAGNOSIS — E78.2 MODERATE MIXED HYPERLIPIDEMIA NOT REQUIRING STATIN THERAPY: ICD-10-CM

## 2025-06-02 DIAGNOSIS — I10 ESSENTIAL HYPERTENSION: ICD-10-CM

## 2025-06-02 NOTE — PROGRESS NOTES
Care Management Monthly Outreach  Chart review completed    Last Office Visit: 5/30/2025  Next Office Visit: Visit date not found   APC: n/a    Has patient been to ER/Urgent Care since last outreach? No    Outreach attempted: left message on voicemail requesting return call.    Radha Lepe LPN Care Manager

## 2025-06-09 ENCOUNTER — PATIENT OUTREACH (OUTPATIENT)
Dept: PRIMARY CARE | Facility: CLINIC | Age: 85
End: 2025-06-09
Payer: MEDICARE

## 2025-06-09 DIAGNOSIS — E78.2 MODERATE MIXED HYPERLIPIDEMIA NOT REQUIRING STATIN THERAPY: ICD-10-CM

## 2025-06-09 DIAGNOSIS — I10 ESSENTIAL HYPERTENSION: ICD-10-CM

## 2025-06-09 SDOH — HEALTH STABILITY: PHYSICAL HEALTH: ON AVERAGE, HOW MANY DAYS PER WEEK DO YOU ENGAGE IN MODERATE TO STRENUOUS EXERCISE (LIKE A BRISK WALK)?: 6 DAYS

## 2025-06-09 SDOH — ECONOMIC STABILITY: TRANSPORTATION INSECURITY
IN THE PAST 12 MONTHS, HAS LACK OF TRANSPORTATION KEPT YOU FROM MEETINGS, WORK, OR FROM GETTING THINGS NEEDED FOR DAILY LIVING?: NO

## 2025-06-09 SDOH — ECONOMIC STABILITY: FOOD INSECURITY: WITHIN THE PAST 12 MONTHS, THE FOOD YOU BOUGHT JUST DIDN'T LAST AND YOU DIDN'T HAVE MONEY TO GET MORE.: NEVER TRUE

## 2025-06-09 SDOH — ECONOMIC STABILITY: TRANSPORTATION INSECURITY
IN THE PAST 12 MONTHS, HAS THE LACK OF TRANSPORTATION KEPT YOU FROM MEDICAL APPOINTMENTS OR FROM GETTING MEDICATIONS?: NO

## 2025-06-09 SDOH — HEALTH STABILITY: PHYSICAL HEALTH: ON AVERAGE, HOW MANY MINUTES DO YOU ENGAGE IN EXERCISE AT THIS LEVEL?: 30 MIN

## 2025-06-09 SDOH — ECONOMIC STABILITY: INCOME INSECURITY: IN THE LAST 12 MONTHS, WAS THERE A TIME WHEN YOU WERE NOT ABLE TO PAY THE MORTGAGE OR RENT ON TIME?: NO

## 2025-06-09 ASSESSMENT — SOCIAL DETERMINANTS OF HEALTH (SDOH)
HOW HARD IS IT FOR YOU TO PAY FOR THE VERY BASICS LIKE FOOD, HOUSING, MEDICAL CARE, AND HEATING?: NOT HARD AT ALL
IN THE PAST 12 MONTHS, HAS THE ELECTRIC, GAS, OIL, OR WATER COMPANY THREATENED TO SHUT OFF SERVICE IN YOUR HOME?: NO

## 2025-06-09 NOTE — PROGRESS NOTES
Care Management Monthly Outreach  Chart review completed  Confirmation of at least 2 patient identifiers  Change in insurance? No    Has patient been to ER/Urgent Care since last outreach? No    Last Office Visit with PCP: 5/30/2025   Next Office Visit with PCP: 12/5/2025   APC Collaboration: n/a    Chronic Conditions and Outreach Summary:   Essential hypertension    Moderate mixed hyperlipidemia not requiring statin therapy    Patient states she is doing well. Patient had PCP appointment since our previous conversation. Reviewed the appointment with patient. Reviewed cholesterol results and supplement recommendations given by Cheryl Hagen CNP. Patient wrote both omega 3 fish oil and coQ10 names down to buy at the drug store. Patient stated understanding and plans to start taking each regularly. Reviewed other methods to help cholesterol like diet and exercise. Patient is very active and walks for about 30 minutes every day. Patient does her own lawn mowing and yard work also. Reviewed diet with patient.     Medications:   Are there medication changes since last visit? Yes - omega 3 fish oil and coQ10  Refills needed? No    Social Drivers of Health: Addressed today  Care Gaps Addressed? Addressed in the last 6 months  Care Plan addressed: Yes    Upcoming Appointments:   Future Appointments       Date / Time Provider Department Dept Phone    12/5/2025 11:00 AM (Arrive by 10:45 AM) Cheryl Hagen, JOHN-CNP [facility]           Blood Pressures Reviewed  BP Readings from Last 3 Encounters:   05/30/25 128/88   12/11/24 149/84   10/18/24 136/82     Labs Reviewed:  Lab Results   Component Value Date    CREATININE 0.90 05/28/2024    GLUCOSE 90 05/28/2024    ALKPHOS 83 05/28/2024    K 4.3 05/28/2024    PROT 7.2 05/28/2024     05/28/2024    CALCIUM 9.6 05/28/2024    AST 22 05/28/2024    ALT 18 05/28/2024    BUN 25 05/28/2024     Lab Results   Component Value Date    TRIG 193 (H) 05/30/2025    CHOL 282  (H) 05/30/2025    LDLCALC 164 (H) 05/30/2025    HDL 83 05/30/2025     Lab Results   Component Value Date    HGBA1C 5.2 05/30/2025    HGBA1C 5.3 05/28/2024     Lab Results   Component Value Date    WBC 4.8 05/30/2025    RBC 4.37 05/30/2025    HGB 14.6 05/30/2025     05/30/2025   No other concerns at this time.  Agreeable to continue monthly outreaches.  Encouraged to call if questions or concerns arise.    Radha Lepe LPN Care Manager

## 2025-07-03 ENCOUNTER — PATIENT OUTREACH (OUTPATIENT)
Dept: PRIMARY CARE | Facility: CLINIC | Age: 85
End: 2025-07-03
Payer: MEDICARE

## 2025-07-03 DIAGNOSIS — E78.2 MODERATE MIXED HYPERLIPIDEMIA NOT REQUIRING STATIN THERAPY: ICD-10-CM

## 2025-07-03 DIAGNOSIS — I10 ESSENTIAL HYPERTENSION: ICD-10-CM

## 2025-07-03 NOTE — PROGRESS NOTES
Care Management Monthly Outreach  Chart review completed  Confirmation of at least 2 patient identifiers  Change in insurance? No    Has patient been to ER/Urgent Care since last outreach? No    Last Office Visit with PCP: 5/30/2025   Next Office Visit with PCP: 12/5/2025   APC Collaboration: n/a    Chronic Conditions and Outreach Summary:   Essential hypertension    Moderate mixed hyperlipidemia not requiring statin therapy    Patient states she is doing well. Inquired if patient had started the 2 supplements Cheryl Hagen CNP recommended, omega 3 fish oil and coQ10. Patient states she has not started either and states she is okay on her current medication regimen. Patient had a birthday since our previous conversation. Patient was able to spend time with family and get out of the house for some time. Patient is very active and walks for about 30 minutes every day around the neighborhood. Patient does her own lawn mowing and yard work also. Reviewed diet with patient and water intake.     Medications:   Are there medication changes since last visit? No  Refills needed? No    Social Drivers of Health: Deferred  Care Gaps Addressed? Addressed in the last 6 months  Care Plan addressed: Yes    Upcoming Appointments:   Future Appointments       Date / Time Provider Department Dept Phone    12/5/2025 11:00 AM (Arrive by 10:45 AM) REBECA Tapia [facility]           Blood Pressures Reviewed  BP Readings from Last 3 Encounters:   05/30/25 128/88   12/11/24 149/84   10/18/24 136/82     Labs Reviewed:  Lab Results   Component Value Date    CREATININE 0.90 05/28/2024    GLUCOSE 90 05/28/2024    ALKPHOS 83 05/28/2024    K 4.3 05/28/2024    PROT 7.2 05/28/2024     05/28/2024    CALCIUM 9.6 05/28/2024    AST 22 05/28/2024    ALT 18 05/28/2024    BUN 25 05/28/2024     Lab Results   Component Value Date    TRIG 193 (H) 05/30/2025    CHOL 282 (H) 05/30/2025    LDLCALC 164 (H) 05/30/2025    HDL 83  05/30/2025     Lab Results   Component Value Date    HGBA1C 5.2 05/30/2025    HGBA1C 5.3 05/28/2024     Lab Results   Component Value Date    WBC 4.8 05/30/2025    RBC 4.37 05/30/2025    HGB 14.6 05/30/2025     05/30/2025   No other concerns at this time.  Agreeable to continue monthly outreaches.  Encouraged to call if questions or concerns arise.    Radha Lepe LPN Care Manager

## 2025-08-01 ENCOUNTER — DOCUMENTATION (OUTPATIENT)
Dept: PRIMARY CARE | Facility: CLINIC | Age: 85
End: 2025-08-01
Payer: MEDICARE

## 2025-08-01 NOTE — PROGRESS NOTES
.Reviewed chart in collaboration with LPN.  Care plan updated for relative chronic health conditions

## 2025-08-04 ENCOUNTER — PATIENT OUTREACH (OUTPATIENT)
Dept: PRIMARY CARE | Facility: CLINIC | Age: 85
End: 2025-08-04
Payer: MEDICARE

## 2025-08-04 DIAGNOSIS — E78.2 MODERATE MIXED HYPERLIPIDEMIA NOT REQUIRING STATIN THERAPY: ICD-10-CM

## 2025-08-04 DIAGNOSIS — F09 MILD COGNITIVE DISORDER: ICD-10-CM

## 2025-08-04 DIAGNOSIS — I10 ESSENTIAL HYPERTENSION: ICD-10-CM

## 2025-08-04 NOTE — PROGRESS NOTES
Care Management Monthly Outreach  Chart review completed  Confirmation of at least 2 patient identifiers  Change in insurance? No    Has patient been to ER/Urgent Care since last outreach? No    Last Office Visit with PCP: 5/30/2025   Next Office Visit with PCP: 12/5/2025   APC Collaboration: n/a    Chronic Conditions and Outreach Summary:   No diagnosis found.    Patient states she is doing well. Patient [place] with a friend. Patient is very active and walks for about 30 minutes every day around the neighborhood. Patient does her own lawn mowing and yard work also. Patient's daughter is very involved and they speak daily. Patient continues to live on her own. Patient is not currently on any prescribed medications. Memory concerns are prevalent, I have to reintroduce myself to the patient with each phone call. Patient otherwise doing well and states no concerns at this time.    Medications:   Are there medication changes since last visit? No  Refills needed? No    Social Drivers of Health: Deferred  Care Gaps Addressed? Addressed in the last 6 months  Care Plan addressed: Yes    Upcoming Appointments:   Future Appointments       Date / Time Provider Department Dept Phone    12/5/2025 11:00 AM (Arrive by 10:45 AM) Cheryl Hagen APRN-CNP [facility]           Blood Pressures Reviewed  BP Readings from Last 3 Encounters:   05/30/25 128/88   12/11/24 149/84   10/18/24 136/82     Labs Reviewed:  Lab Results   Component Value Date    CREATININE 0.90 05/28/2024    GLUCOSE 90 05/28/2024    ALKPHOS 83 05/28/2024    K 4.3 05/28/2024    PROT 7.2 05/28/2024     05/28/2024    CALCIUM 9.6 05/28/2024    AST 22 05/28/2024    ALT 18 05/28/2024    BUN 25 05/28/2024     Lab Results   Component Value Date    TRIG 193 (H) 05/30/2025    CHOL 282 (H) 05/30/2025    LDLCALC 164 (H) 05/30/2025    HDL 83 05/30/2025     Lab Results   Component Value Date     HGBA1C 5.2 05/30/2025    HGBA1C 5.3 05/28/2024     Lab Results   Component Value Date    WBC 4.8 05/30/2025    RBC 4.37 05/30/2025    HGB 14.6 05/30/2025     05/30/2025   No other concerns at this time.  Agreeable to continue monthly outreaches.  Encouraged to call if questions or concerns arise.    Radha Lepe LPN Care Manager